# Patient Record
Sex: FEMALE | Race: WHITE | NOT HISPANIC OR LATINO | Employment: FULL TIME | URBAN - METROPOLITAN AREA
[De-identification: names, ages, dates, MRNs, and addresses within clinical notes are randomized per-mention and may not be internally consistent; named-entity substitution may affect disease eponyms.]

---

## 2017-04-03 DIAGNOSIS — E03.4 HYPOTHYROIDISM DUE TO ACQUIRED ATROPHY OF THYROID: ICD-10-CM

## 2017-04-03 NOTE — TELEPHONE ENCOUNTER
Levothyroxine 100mcg     Last Written Prescription Date: 07/22/2016 #90 x 1  Last filled 02/20/2017  Last Office Visit with [facility], P or [facility] prescribing provider: 07/28/2016 CHARLES Kellogg        TSH   Date Value Ref Range Status   07/21/2016 7.64 (H) 0.40 - 4.00 mU/L Final

## 2017-04-03 NOTE — TELEPHONE ENCOUNTER
Routing refill request to provider for review/approval because:  Labs out of range:  See below    Kamilla Carrington RN

## 2017-04-04 RX ORDER — LEVOTHYROXINE SODIUM 100 UG/1
100 TABLET ORAL DAILY
Qty: 90 TABLET | Refills: 0 | Status: SHIPPED | OUTPATIENT
Start: 2017-04-04 | End: 2017-04-23 | Stop reason: DRUGHIGH

## 2017-04-04 NOTE — TELEPHONE ENCOUNTER
Filled once.  Pt is over due for TSH.  Please call and ask her to schedue lab only appointment.  Order in.  Rosario Suazo

## 2017-04-21 ENCOUNTER — OFFICE VISIT (OUTPATIENT)
Dept: FAMILY MEDICINE | Facility: CLINIC | Age: 47
End: 2017-04-21
Payer: COMMERCIAL

## 2017-04-21 VITALS
WEIGHT: 139.6 LBS | BODY MASS INDEX: 24.73 KG/M2 | SYSTOLIC BLOOD PRESSURE: 122 MMHG | DIASTOLIC BLOOD PRESSURE: 80 MMHG | TEMPERATURE: 97.8 F | HEART RATE: 76 BPM | HEIGHT: 63 IN

## 2017-04-21 DIAGNOSIS — Z12.31 ENCOUNTER FOR SCREENING MAMMOGRAM FOR BREAST CANCER: ICD-10-CM

## 2017-04-21 DIAGNOSIS — E03.9 HYPOTHYROIDISM, UNSPECIFIED TYPE: ICD-10-CM

## 2017-04-21 DIAGNOSIS — D50.0 IRON DEFICIENCY ANEMIA DUE TO CHRONIC BLOOD LOSS: ICD-10-CM

## 2017-04-21 DIAGNOSIS — Z00.00 ENCOUNTER FOR ROUTINE ADULT HEALTH EXAMINATION WITHOUT ABNORMAL FINDINGS: Primary | ICD-10-CM

## 2017-04-21 DIAGNOSIS — R53.83 FATIGUE, UNSPECIFIED TYPE: ICD-10-CM

## 2017-04-21 DIAGNOSIS — Z13.6 CARDIOVASCULAR SCREENING; LDL GOAL LESS THAN 160: ICD-10-CM

## 2017-04-21 DIAGNOSIS — E04.1 THYROID NODULE: ICD-10-CM

## 2017-04-21 LAB
ERYTHROCYTE [DISTWIDTH] IN BLOOD BY AUTOMATED COUNT: 13.1 % (ref 10–15)
HCT VFR BLD AUTO: 39.6 % (ref 35–47)
HGB BLD-MCNC: 13 G/DL (ref 11.7–15.7)
MCH RBC QN AUTO: 28.9 PG (ref 26.5–33)
MCHC RBC AUTO-ENTMCNC: 32.8 G/DL (ref 31.5–36.5)
MCV RBC AUTO: 88 FL (ref 78–100)
PLATELET # BLD AUTO: 222 10E9/L (ref 150–450)
RBC # BLD AUTO: 4.5 10E12/L (ref 3.8–5.2)
TSH SERPL DL<=0.05 MIU/L-ACNC: 8.11 MU/L (ref 0.4–4)
WBC # BLD AUTO: 4.1 10E9/L (ref 4–11)

## 2017-04-21 PROCEDURE — 99396 PREV VISIT EST AGE 40-64: CPT | Performed by: FAMILY MEDICINE

## 2017-04-21 PROCEDURE — 82306 VITAMIN D 25 HYDROXY: CPT | Performed by: FAMILY MEDICINE

## 2017-04-21 PROCEDURE — 36415 COLL VENOUS BLD VENIPUNCTURE: CPT | Performed by: FAMILY MEDICINE

## 2017-04-21 PROCEDURE — 85027 COMPLETE CBC AUTOMATED: CPT | Performed by: FAMILY MEDICINE

## 2017-04-21 PROCEDURE — 84443 ASSAY THYROID STIM HORMONE: CPT | Performed by: FAMILY MEDICINE

## 2017-04-21 NOTE — MR AVS SNAPSHOT
After Visit Summary   4/21/2017    Kendra Montenegro    MRN: [id number]           Patient Information     Date Of Birth          1970        Visit Information        Provider Department      4/21/2017 2:00 PM Rosario Suazo DO [facility]        Today's Diagnoses     Encounter for routine adult health examination without abnormal findings    -  1    Hypothyroidism, unspecified type        Iron deficiency anemia due to chronic blood loss        Fatigue, unspecified type        Thyroid nodule        Encounter for screening mammogram for breast cancer        CARDIOVASCULAR SCREENING; LDL GOAL LESS THAN 160          Care Instructions    I'll let you know your lab results when they are available.    Please schedule the thyroid ultrasound when you are able to follow up the nodule    You can call [phone] to schedule your mammogram at the same time    Please call to schedule a lab appointment for the fasting cholesterol when you are able.  You will need to be fasting for 12 hours (nothing but water) prior to your blood work.    Preventive Health Recommendations  Female Ages 40 to 49    Yearly exam:     See your health care provider every year in order to  1. Review health changes.   2. Discuss preventive care.    3. Review your medicines if your doctor prescribed any.      Get a Pap test every three years (unless you have an abnormal result and your provider advises testing more often).      If you get Pap tests with HPV test, you only need to test every 5 years, unless you have an abnormal result. You do not need a Pap test if your uterus was removed (hysterectomy) and you have not had cancer.      You should be tested each year for STDs (sexually transmitted diseases), if you're at risk.       Ask your doctor if you should have a mammogram.      Have a colonoscopy (test for colon cancer) if someone in your family has had colon cancer or polyps before age 50.       Have a  cholesterol test every 5 years.       Have a diabetes test (fasting glucose) after age 45. If you are at risk for diabetes, you should have this test every 3 years.    Shots: Get a flu shot each year. Get a tetanus shot every 10 years.     Nutrition:     Eat at least 5 servings of fruits and vegetables each day.    Eat whole-grain bread, whole-wheat pasta and brown rice instead of white grains and rice.    Talk to your provider about Calcium and Vitamin D.     Lifestyle    Exercise at least 150 minutes a week (an average of 30 minutes a day, 5 days a week). This will help you control your weight and prevent disease.    Limit alcohol to one drink per day.    No smoking.     Wear sunscreen to prevent skin cancer.    See your dentist every six months for an exam and cleaning.        Follow-ups after your visit        Future tests that were ordered for you today     Open Future Orders        Priority Expected Expires Ordered    Lipid Profile with reflex to direct LDL Routine 4/24/2017 4/21/2018 4/21/2017    MA Screening Digital Bilateral Routine  4/21/2018 4/21/2017    US Thyroid Routine  4/21/2018 4/21/2017            Who to contact     Normal or non-critical lab and imaging results will be communicated to you by [employer], letter or phone within 4 business days after the clinic has received the results. If you do not hear from us within 7 days, please contact the clinic through [employer] or phone. If you have a critical or abnormal lab result, we will notify you by phone as soon as possible.  Submit refill requests through [email or web address] or call your pharmacy and they will forward the refill request to us. Please allow 3 business days for your refill to be completed.          If you need to speak with a  for additional information , please call: [phone]           Additional Information About Your Visit        [email or web address] Information     [email or web address] lets you send messages to your doctor, view your test results,  "renew your prescriptions, schedule appointments and more. To sign up, go to [email or web address] . Click on \"Log in\" on the left side of the screen, which will take you to the Welcome page. Then click on \"Sign up Now\" on the right side of the page.     You will be asked to enter the access code listed below, as well as some personal information. Please follow the directions to create your username and password.     Your access code is: [email or web address]  Expires: 2017  2:29 PM     Your access code will  in 90 days. If you need help or a new code, please call your [facility] clinic or [phone].        Care EveryWhere ID     This is your Care EveryWhere ID. This could be used by other organizations to access your [facility] medical records  [id number]        Your Vitals Were     Pulse Temperature Height Last Period Breastfeeding? BMI (Body Mass Index)    76 97.8  F (36.6  C) (Tympanic) 5' 2.75\" (1.594 m) 2015 (LMP Unknown) No 24.93 kg/m2       Blood Pressure from Last 3 Encounters:   17 122/80   16 124/70   16 114/73    Weight from Last 3 Encounters:   17 139 lb 9.6 oz (63.3 kg)   16 137 lb (62.1 kg)   16 134 lb 9.6 oz (61.1 kg)              We Performed the Following     CBC with platelets     TSH     Vitamin D Deficiency        Primary Care Provider Office Phone # Fax #    Rosario Suazo [phone]       [facility] [address] [place] [address]        Thank you!     Thank you for choosing [facility]  for your care. Our goal is always to provide you with excellent care. Hearing back from our patients is one way we can continue to improve our services. Please take a few minutes to complete the written survey that you may receive in the mail after your visit with us. Thank you!             Your Updated Medication List - Protect others around you: Learn how to safely use, store and throw away your medicines at " www.disposemymeds.org.          This list is accurate as of: 4/21/17  2:40 PM.  Always use your most recent med list.                   Brand Name Dispense Instructions for use    levothyroxine 100 MCG tablet    SYNTHROID/LEVOTHROID    90 tablet    Take 1 tablet (100 mcg) by mouth daily       Multiple vitamin Tabs      Take 1 tablet by mouth daily

## 2017-04-21 NOTE — PATIENT INSTRUCTIONS
I'll let you know your lab results when they are available.    Please schedule the thyroid ultrasound when you are able to follow up the nodule    You can call [phone] to schedule your mammogram at the same time    Please call to schedule a lab appointment for the fasting cholesterol when you are able.  You will need to be fasting for 12 hours (nothing but water) prior to your blood work.    Preventive Health Recommendations  Female Ages 40 to 49    Yearly exam:     See your health care provider every year in order to  1. Review health changes.   2. Discuss preventive care.    3. Review your medicines if your doctor prescribed any.      Get a Pap test every three years (unless you have an abnormal result and your provider advises testing more often).      If you get Pap tests with HPV test, you only need to test every 5 years, unless you have an abnormal result. You do not need a Pap test if your uterus was removed (hysterectomy) and you have not had cancer.      You should be tested each year for STDs (sexually transmitted diseases), if you're at risk.       Ask your doctor if you should have a mammogram.      Have a colonoscopy (test for colon cancer) if someone in your family has had colon cancer or polyps before age 50.       Have a cholesterol test every 5 years.       Have a diabetes test (fasting glucose) after age 45. If you are at risk for diabetes, you should have this test every 3 years.    Shots: Get a flu shot each year. Get a tetanus shot every 10 years.     Nutrition:     Eat at least 5 servings of fruits and vegetables each day.    Eat whole-grain bread, whole-wheat pasta and brown rice instead of white grains and rice.    Talk to your provider about Calcium and Vitamin D.     Lifestyle    Exercise at least 150 minutes a week (an average of 30 minutes a day, 5 days a week). This will help you control your weight and prevent disease.    Limit alcohol to one drink per day.    No smoking.     Wear  sunscreen to prevent skin cancer.    See your dentist every six months for an exam and cleaning.

## 2017-04-21 NOTE — PROGRESS NOTES
"   SUBJECTIVE:     CC: Kendra Montenegro is an 46 year old woman who presents for preventive health visit.     Healthy Habits:    Do you get at least three servings of calcium containing foods daily (dairy, green leafy vegetables, etc.)? yes    Amount of exercise or daily activities, outside of work: 2 day(s) per week     Problems taking medications regularly No    Medication side effects: No    Have you had an eye exam in the past two years? no    Do you see a dentist twice per year? yes    Do you have sleep apnea, excessive snoring or daytime drowsiness?no      * requesting to have hemoglobin checked - she \"feels off\" sometimes.  Feels like a \"brain fog\".  Concerned that she might be anemic again.    Had hyst last year due to menorrhagia and severe anemia.  No bleeding since that time.    Had previously followed with endocrinology for her hypothyroidism but has not seen them in some time.  Requesting to transfer her thyroid management here.  Had a >1cm nodule noted on ultrasound.  Pt states she did have biopsy with her endocrinologist which was normal.  Needs f/u imaging.        Today's PHQ-2 Score:   PHQ-2 ( 1999 Pf[employer]) 4/21/2017 5/31/2016   Q1: Little interest or pleasure in doing things 0 0   Q2: Feeling down, depressed or hopeless 0 0   PHQ-2 Score 0 0       Abuse: Current or Past(Physical, Sexual or Emotional)- No  Do you feel safe in your environment - Yes    Social History   Substance Use Topics     Smoking status: Never Smoker     Smokeless tobacco: Never Used     Alcohol use No     The patient does not drink >3 drinks per day nor >7 drinks per week.    No results for input(s): CHOL, HDL, LDL, TRIG, CHOLHDLRATIO, NHDL in the last 20189 hours.    Reviewed orders with patient.  Reviewed health maintenance and updated orders accordingly - Yes    Mammo Decision Support:  Patient under age 50, mutual decision reflected in health maintenance.      Pertinent mammograms are reviewed under the imaging tab.  History " of abnormal Pap smear: Status post benign hysterectomy. Health Maintenance and Surgical History updated.    Reviewed and updated as needed this visit by clinical staff  Tobacco  Allergies  Meds  Med Hx  Surg Hx  Fam Hx  Soc Hx        Reviewed and updated as needed this visit by Provider  Tobacco  Allergies  Med Hx  Surg Hx  Fam Hx  Soc Hx       Past Medical History:   Diagnosis Date     Hx of abnormal Pap smear ??    possible CRYO in the past?      Past Surgical History:   Procedure Laterality Date     CYSTOSCOPY N/A 1/11/2016    Procedure: CYSTOSCOPY;  Surgeon: Juan Jose Burgess MD;  Location: [facility] OR     DILATION AND CURETTAGE, HYSTEROSCOPY DIAGNOSTIC, COMBINED N/A 10/2/2015    Procedure: COMBINED DILATION AND CURETTAGE, HYSTEROSCOPY DIAGNOSTIC;  Surgeon: Juan Jose Burgess MD;  Location: [facility] OR     HYSTERECTOMY, PAP NO LONGER INDICATED  1/11/2016     LAPAROSCOPIC HYSTERECTOMY TOTAL, BILATERAL SALPINGO-OOPHORECTOMY, COMBINED N/A 1/11/2016    Procedure: COMBINED LAPAROSCOPIC HYSTERECTOMY TOTAL, SALPINGO-OOPHORECTOMY;  Surgeon: Juan Jose Burgess MD;  Location: [facility] OR     SURGICAL HISTORY OF -   1999    removal of endometriosis,L ovary and a fallopian tube     TUBAL LIGATION         ROS:  C: NEGATIVE for fever, chills, change in weight  I: NEGATIVE for worrisome rashes, moles or lesions  E: NEGATIVE for vision changes or irritation  ENT: NEGATIVE for ear, mouth and throat problems  R: NEGATIVE for significant cough or SOB  B: NEGATIVE for masses, tenderness or discharge  CV: NEGATIVE for chest pain, palpitations or peripheral edema  GI: NEGATIVE for nausea, abdominal pain, heartburn, or change in bowel habits  : NEGATIVE for unusual urinary or vaginal symptoms. No vaginal bleeding.  M: NEGATIVE for significant arthralgias or myalgia  N: NEGATIVE for weakness, dizziness or paresthesias  P: NEGATIVE for changes in mood or affect     Problem list, Medication list, Allergies, and  "Medical/Social/Surgical histories reviewed in [facility] and updated as appropriate.  OBJECTIVE:     /80 (BP Location: Left arm, Cuff Size: Adult Regular)  Pulse 76  Temp 97.8  F (36.6  C) (Tympanic)  Ht 5' 2.75\" (1.594 m)  Wt 139 lb 9.6 oz (63.3 kg)  LMP 11/29/2015 (LMP Unknown)  Breastfeeding? No  BMI 24.93 kg/m2  EXAM:  GENERAL: healthy, alert and no distress  EYES: Eyes grossly normal to inspection, PERRL and conjunctivae and sclerae normal  HENT: ear canals and TM's normal, nose and mouth without ulcers or lesions  NECK: no adenopathy, no asymmetry, masses, or scars and thyroid normal to palpation  RESP: lungs clear to auscultation - no rales, rhonchi or wheezes  BREAST: normal without masses, tenderness or nipple discharge and no palpable axillary masses or adenopathy  CV: regular rate and rhythm, normal S1 S2, no S3 or S4, no murmur, click or rub, no peripheral edema and peripheral pulses strong  ABDOMEN: soft, nontender, no hepatosplenomegaly, no masses and bowel sounds normal  MS: no gross musculoskeletal defects noted, no edema  NEURO: Normal strength and tone, mentation intact and speech normal  PSYCH: mentation appears normal, affect normal/bright    ASSESSMENT/PLAN:         ICD-10-CM    1. Encounter for routine adult health examination without abnormal findings Z00.00    2. Hypothyroidism, unspecified type E03.9 TSH   3. Iron deficiency anemia due to chronic blood loss D50.0 CBC with platelets   4. Fatigue, unspecified type R53.83 Vitamin D Deficiency   5. Thyroid nodule E04.1 US Thyroid   6. Encounter for screening mammogram for breast cancer Z12.31 MA Screening Digital Bilateral   7. CARDIOVASCULAR SCREENING; LDL GOAL LESS THAN 160 Z13.6 Lipid Profile with reflex to direct LDL       COUNSELING:   Reviewed preventive health counseling, as reflected in patient instructions  Special attention given to:        Regular exercise       Healthy diet/nutrition       Osteoporosis Prevention/Bone Health   " "    Colon cancer screening       (Celia)menopause management    BP Screening:   Last 3 BP Readings:    BP Readings from Last 3 Encounters:   04/21/17 122/80   07/28/16 124/70   05/31/16 114/73       The following was recommended to the patient:  Re-screen BP within a year and recommended lifestyle modifications     reports that she has never smoked. She has never used smokeless tobacco.    Estimated body mass index is 24.93 kg/(m^2) as calculated from the following:    Height as of this encounter: 5' 2.75\" (1.594 m).    Weight as of this encounter: 139 lb 9.6 oz (63.3 kg).       Counseling Resources:  ATP IV Guidelines  Pooled Cohorts Equation Calculator  Breast Cancer Risk Calculator  FRAX Risk Assessment  ICSI Preventive Guidelines  Dietary Guidelines for Americans, 2010  [employer]'s MyPlate  ASA Prophylaxis  Lung CA Screening    Rosario Suazo, DO  [facility]    Patient Instructions   I'll let you know your lab results when they are available.    Please schedule the thyroid ultrasound when you are able to follow up the nodule    You can call [phone] to schedule your mammogram at the same time    Please call to schedule a lab appointment for the fasting cholesterol when you are able.  You will need to be fasting for 12 hours (nothing but water) prior to your blood work.    Preventive Health Recommendations  Female Ages 40 to 49    Yearly exam:     See your health care provider every year in order to  1. Review health changes.   2. Discuss preventive care.    3. Review your medicines if your doctor prescribed any.      Get a Pap test every three years (unless you have an abnormal result and your provider advises testing more often).      If you get Pap tests with HPV test, you only need to test every 5 years, unless you have an abnormal result. You do not need a Pap test if your uterus was removed (hysterectomy) and you have not had cancer.      You should be tested each year for STDs (sexually " transmitted diseases), if you're at risk.       Ask your doctor if you should have a mammogram.      Have a colonoscopy (test for colon cancer) if someone in your family has had colon cancer or polyps before age 50.       Have a cholesterol test every 5 years.       Have a diabetes test (fasting glucose) after age 45. If you are at risk for diabetes, you should have this test every 3 years.    Shots: Get a flu shot each year. Get a tetanus shot every 10 years.     Nutrition:     Eat at least 5 servings of fruits and vegetables each day.    Eat whole-grain bread, whole-wheat pasta and brown rice instead of white grains and rice.    Talk to your provider about Calcium and Vitamin D.     Lifestyle    Exercise at least 150 minutes a week (an average of 30 minutes a day, 5 days a week). This will help you control your weight and prevent disease.    Limit alcohol to one drink per day.    No smoking.     Wear sunscreen to prevent skin cancer.    See your dentist every six months for an exam and cleaning.

## 2017-04-21 NOTE — LETTER
[facility]  [address] [address]  [phone]        April 27, 2018    Kendra Montenegro  [address] [address]              Dear Kendra Montenegro    This is to remind you that your fasting lab is due.    You may call our office at [phone] to schedule an appointment.    Please disregard this notice if you have already had your labs drawn or made an appointment.        Sincerely,        Rosario Suazo DO

## 2017-04-21 NOTE — NURSING NOTE
"Initial /80 (BP Location: Left arm, Cuff Size: Adult Regular)  Pulse 76  Temp 97.8  F (36.6  C) (Tympanic)  Ht 5' 2.75\" (1.594 m)  Wt 139 lb 9.6 oz (63.3 kg)  LMP 11/29/2015 (LMP Unknown)  Breastfeeding? No  BMI 24.93 kg/m2 Estimated body mass index is 24.93 kg/(m^2) as calculated from the following:    Height as of this encounter: 5' 2.75\" (1.594 m).    Weight as of this encounter: 139 lb 9.6 oz (63.3 kg). .      "

## 2017-04-22 LAB — DEPRECATED CALCIDIOL+CALCIFEROL SERPL-MC: 16 UG/L (ref 20–75)

## 2017-04-23 DIAGNOSIS — E06.3 HYPOTHYROIDISM DUE TO HASHIMOTO'S THYROIDITIS: Primary | ICD-10-CM

## 2017-04-23 RX ORDER — LEVOTHYROXINE SODIUM 112 UG/1
112 TABLET ORAL DAILY
Qty: 90 TABLET | Refills: 0 | Status: SHIPPED | OUTPATIENT
Start: 2017-04-23 | End: 2017-05-26

## 2017-05-25 DIAGNOSIS — E03.4 HYPOTHYROIDISM DUE TO ACQUIRED ATROPHY OF THYROID: ICD-10-CM

## 2017-05-25 LAB — TSH SERPL DL<=0.05 MIU/L-ACNC: 1.07 MU/L (ref 0.4–4)

## 2017-05-25 PROCEDURE — 36415 COLL VENOUS BLD VENIPUNCTURE: CPT | Performed by: FAMILY MEDICINE

## 2017-05-25 PROCEDURE — 84443 ASSAY THYROID STIM HORMONE: CPT | Performed by: FAMILY MEDICINE

## 2017-05-26 ENCOUNTER — TELEPHONE (OUTPATIENT)
Dept: FAMILY MEDICINE | Facility: CLINIC | Age: 47
End: 2017-05-26

## 2017-05-26 DIAGNOSIS — E06.3 HYPOTHYROIDISM DUE TO HASHIMOTO'S THYROIDITIS: ICD-10-CM

## 2017-05-26 RX ORDER — LEVOTHYROXINE SODIUM 112 UG/1
112 TABLET ORAL DAILY
Qty: 90 TABLET | Refills: 1 | Status: SHIPPED | OUTPATIENT
Start: 2017-05-26 | End: 2018-01-11

## 2017-05-26 NOTE — TELEPHONE ENCOUNTER
Patient called and came in yesterday for lab work to get a refill on her levothyroxine.  Patient was wondering if her blood work can be looked at so she can get her medication refilled today and sent to [facility] [address].    Thank you    Yelena Anne, [facility]

## 2017-05-30 ENCOUNTER — HOSPITAL ENCOUNTER (OUTPATIENT)
Dept: MAMMOGRAPHY | Facility: CLINIC | Age: 47
End: 2017-05-30
Attending: FAMILY MEDICINE
Payer: COMMERCIAL

## 2017-05-30 ENCOUNTER — HOSPITAL ENCOUNTER (OUTPATIENT)
Dept: ULTRASOUND IMAGING | Facility: CLINIC | Age: 47
Discharge: HOME | End: 2017-05-30
Attending: FAMILY MEDICINE | Admitting: FAMILY MEDICINE
Payer: COMMERCIAL

## 2017-05-30 DIAGNOSIS — Z12.31 ENCOUNTER FOR SCREENING MAMMOGRAM FOR BREAST CANCER: ICD-10-CM

## 2017-05-30 DIAGNOSIS — E04.1 THYROID NODULE: ICD-10-CM

## 2017-05-30 PROCEDURE — 76536 US EXAM OF HEAD AND NECK: CPT

## 2017-05-30 PROCEDURE — G0202 SCR MAMMO BI INCL CAD: HCPCS

## 2017-06-11 ENCOUNTER — NURSE TRIAGE (OUTPATIENT)
Dept: NURSING | Facility: CLINIC | Age: 47
End: 2017-06-11

## 2017-06-11 NOTE — TELEPHONE ENCOUNTER
"Caller states that for past  24 hrs, exclusive of sleep has been experiencing  Sensation that starts at frontal hairline and goes down across forehead and feels eyelid \"nusrat close\" ; sensaion  Lasts brief second or  2 and is of varying intensity and this afternoon more frequent,  happening several times in an hour.    Reason for Disposition    [1] Eye pain/discomfort AND [2] more than mild    Additional Information    Negative: Followed an eye injury    Negative: Eye pain from chemical in the eye    Negative: Eye pain from foreign body in eye    Negative: [1] Tender, red lump or pimple AND [2] located along the eyelid margin    Negative: Has sinus pain or pressure    Negative: Severe eye pain    Negative: Complete loss of vision in one or both eyes    Negative: [1] Eyelids are very swollen (shut or almost) AND [2] fever    Negative: [1] Eyelid (outer) is very red AND [2] fever    Negative: [1] Foreign body sensation (\"feels like something is in there\") AND [2] irrigation didn't help    Negative: Vomiting    Negative: Ulcer or sore seen on the cornea (clear center part of the eye)    Negative: [1] Recent eye surgery AND [2] increasing eye pain    Negative: [1] Blurred vision AND [2] new or worsening    Negative: Patient sounds very sick or weak to the triager    Protocols used: EYE PAIN-ADULT-  Shanda Beltran RN  [facility]    "

## 2018-01-11 DIAGNOSIS — E06.3 HYPOTHYROIDISM DUE TO HASHIMOTO'S THYROIDITIS: ICD-10-CM

## 2018-01-11 NOTE — TELEPHONE ENCOUNTER
"Requested Prescriptions   Pending Prescriptions Disp Refills     levothyroxine (SYNTHROID/LEVOTHROID) 112 MCG tablet [Pharmacy Med Name: LEVOTHYROXINE 0.112MG (112MCG) TABS] 90 tablet 0    Last Written Prescription Date:  05/26/2017 #90 x 1  Last filled 12/08/2017  Last Office Visit with [facility], P or [facility] prescribing provider:  04/21/2017 CHARLES Suazo   Future Office Visit:  none   Sig: TAKE 1 TABLET(112 MCG) BY MOUTH DAILY    Thyroid Protocol Passed    1/11/2018  7:38 AM       Passed - Patient is 12 years or older       Passed - Recent or future visit with authorizing provider's specialty    Patient had office visit in the last year or has a visit in the next 30 days with authorizing provider.  See \"Patient Info\" tab in inbasket, or \"Choose Columns\" in Meds & Orders section of the refill encounter.              Passed - Normal TSH on file in past 12 months    Recent Labs   Lab Test  05/25/17   0954   TSH  1.07             Passed - No active pregnancy on record    If patient is pregnant or has had a positive pregnancy test, please check TSH.         Passed - No positive pregnancy test in past 12 months    If patient is pregnant or has had a positive pregnancy test, please check TSH.            "

## 2018-01-12 RX ORDER — LEVOTHYROXINE SODIUM 112 UG/1
TABLET ORAL
Qty: 30 TABLET | Refills: 0 | Status: SHIPPED | OUTPATIENT
Start: 2018-01-12 | End: 2018-01-19 | Stop reason: DRUGHIGH

## 2018-01-17 DIAGNOSIS — E06.3 HYPOTHYROIDISM DUE TO HASHIMOTO'S THYROIDITIS: ICD-10-CM

## 2018-01-17 LAB — TSH SERPL DL<=0.005 MIU/L-ACNC: 5.69 MU/L (ref 0.4–4)

## 2018-01-17 PROCEDURE — 84443 ASSAY THYROID STIM HORMONE: CPT | Performed by: FAMILY MEDICINE

## 2018-01-17 PROCEDURE — 36415 COLL VENOUS BLD VENIPUNCTURE: CPT | Performed by: FAMILY MEDICINE

## 2018-01-19 ENCOUNTER — TELEPHONE (OUTPATIENT)
Dept: FAMILY MEDICINE | Facility: CLINIC | Age: 48
End: 2018-01-19

## 2018-01-19 DIAGNOSIS — E06.3 HYPOTHYROIDISM DUE TO HASHIMOTO'S THYROIDITIS: Primary | ICD-10-CM

## 2018-01-19 RX ORDER — LEVOTHYROXINE SODIUM 125 UG/1
125 TABLET ORAL DAILY
Qty: 90 TABLET | Refills: 0 | Status: SHIPPED | OUTPATIENT
Start: 2018-01-19 | End: 2018-06-11

## 2018-01-20 NOTE — TELEPHONE ENCOUNTER
Please call pt.  TSH was a bit elevated again.  I sent a new prescription to her pharmacy for a higher dose.  We should recheck TSH again in 3 months.    Rosario Suazo

## 2018-02-10 ENCOUNTER — HEALTH MAINTENANCE LETTER (OUTPATIENT)
Age: 48
End: 2018-02-10

## 2018-02-17 ENCOUNTER — NURSE TRIAGE (OUTPATIENT)
Dept: NURSING | Facility: CLINIC | Age: 48
End: 2018-02-17

## 2018-02-17 NOTE — TELEPHONE ENCOUNTER
Kendra was in on January and had blood drawn and missed some calls regarding test results.  Kendra is calling today for lab results.  [facility] relayed results and advised medication prescription was sent to pharmacy.

## 2018-03-26 ENCOUNTER — OFFICE VISIT (OUTPATIENT)
Dept: FAMILY MEDICINE | Facility: CLINIC | Age: 48
End: 2018-03-26
Payer: COMMERCIAL

## 2018-03-26 VITALS
DIASTOLIC BLOOD PRESSURE: 72 MMHG | SYSTOLIC BLOOD PRESSURE: 114 MMHG | BODY MASS INDEX: 26.14 KG/M2 | TEMPERATURE: 97.3 F | HEART RATE: 72 BPM | WEIGHT: 146.4 LBS

## 2018-03-26 DIAGNOSIS — L90.0 LICHEN SCLEROSUS: Primary | ICD-10-CM

## 2018-03-26 DIAGNOSIS — E06.3 HYPOTHYROIDISM DUE TO HASHIMOTO'S THYROIDITIS: ICD-10-CM

## 2018-03-26 DIAGNOSIS — N95.2 VAGINAL ATROPHY: ICD-10-CM

## 2018-03-26 DIAGNOSIS — K59.00 CONSTIPATION, UNSPECIFIED CONSTIPATION TYPE: ICD-10-CM

## 2018-03-26 LAB — TSH SERPL DL<=0.005 MIU/L-ACNC: 2.26 MU/L (ref 0.4–4)

## 2018-03-26 PROCEDURE — 36415 COLL VENOUS BLD VENIPUNCTURE: CPT | Performed by: FAMILY MEDICINE

## 2018-03-26 PROCEDURE — 84443 ASSAY THYROID STIM HORMONE: CPT | Performed by: FAMILY MEDICINE

## 2018-03-26 PROCEDURE — 99214 OFFICE O/P EST MOD 30 MIN: CPT | Performed by: FAMILY MEDICINE

## 2018-03-26 RX ORDER — CLOBETASOL PROPIONATE 0.5 MG/G
CREAM TOPICAL
Qty: 15 G | Refills: 1 | Status: SHIPPED | OUTPATIENT
Start: 2018-03-26 | End: 2018-05-31

## 2018-03-26 NOTE — PATIENT INSTRUCTIONS
For the constipation:  I would recommend adding in stool softener as needed such as colace.  I will let you know the thyroid results when available.    For the vaginal dryness:  You can try and over the counter moisturizer like replens.  If not effective the next step would be a vaginal estrogen as we discussed.    For the lichen sclerosus:  Apply the steroid cream as we discussed following the regimen listed on the prescription.  Please plan on following up with myself or Dr Burgess in 4 weeks.

## 2018-03-26 NOTE — LETTER
March 27, 2018      Kendra SALONI Lan  [address] [address]        Dear MsIngaMontenegro,    We are writing to inform you of your test results.    Your thyroid is now in good control.  Please continue your current dose and plan a thyroid recheck in 6 months     Resulted Orders   TSH   Result Value Ref Range    TSH 2.26 0.40 - 4.00 mU/L       If you have any questions or concerns, please call the clinic at the number listed above.       Sincerely,        Rosario Suazo, DO/am

## 2018-03-26 NOTE — MR AVS SNAPSHOT
"              After Visit Summary   3/26/2018    Kendra Montenegro    MRN: [id number]           Patient Information     Date Of Birth          1970        Visit Information        Provider Department      3/26/2018 8:20 AM Rosario Suazo DO [facility]        Today's Diagnoses     Lichen sclerosus    -  1    Vaginal atrophy        Hypothyroidism due to Hashimoto's thyroiditis          Care Instructions    For the constipation:  I would recommend adding in stool softener as needed such as colace.  I will let you know the thyroid results when available.    For the vaginal dryness:  You can try and over the counter moisturizer like replens.  If not effective the next step would be a vaginal estrogen as we discussed.    For the lichen sclerosus:  Apply the steroid cream as we discussed following the regimen listed on the prescription.  Please plan on following up with myself or Dr Burgess in 4 weeks.              Follow-ups after your visit        Who to contact     Normal or non-critical lab and imaging results will be communicated to you by InstantLuxehart, letter or phone within 4 business days after the clinic has received the results. If you do not hear from us within 7 days, please contact the clinic through InstantLuxehart or phone. If you have a critical or abnormal lab result, we will notify you by phone as soon as possible.  Submit refill requests through barcoo or call your pharmacy and they will forward the refill request to us. Please allow 3 business days for your refill to be completed.          If you need to speak with a  for additional information , please call: [phone]           Additional Information About Your Visit        barcoo Information     barcoo lets you send messages to your doctor, view your test results, renew your prescriptions, schedule appointments and more. To sign up, go to [email or web address] . Click on \"Log in\" on the left side of the screen, " "which will take you to the Welcome page. Then click on \"Sign up Now\" on the right side of the page.     You will be asked to enter the access code listed below, as well as some personal information. Please follow the directions to create your username and password.     Your access code is: [email or web address]  Expires: 2018  9:11 AM     Your access code will  in 90 days. If you need help or a new code, please call your [facility] or [phone].        Care EveryWhere ID     This is your Care EveryWhere ID. This could be used by other organizations to access your [place] medical records  [id number]        Your Vitals Were     Pulse Temperature Last Period BMI (Body Mass Index)          72 97.3  F (36.3  C) (Tympanic) 2015 (LMP Unknown) 26.14 kg/m2         Blood Pressure from Last 3 Encounters:   18 114/72   17 122/80   16 124/70    Weight from Last 3 Encounters:   18 146 lb 6.4 oz (66.4 kg)   17 139 lb 9.6 oz (63.3 kg)   16 137 lb (62.1 kg)              We Performed the Following     TSH          Today's Medication Changes          These changes are accurate as of 3/26/18  9:11 AM.  If you have any questions, ask your nurse or doctor.               Start taking these medicines.        Dose/Directions    clobetasol 0.05 % cream   Commonly known as:  TEMOVATE   Used for:  Lichen sclerosus   Started by:  Rosario Suazo, DO        Apply sparingly to affected area daily for 4 weeks followed by every other night for 4 weeks then twice weekly.    Do not apply to face.   Quantity:  15 g   Refills:  1            Where to get your medicines      These medications [id number] [place] - [address] AT [facility]  [address] [place] [address]     Phone:  [phone]     clobetasol 0.05 % cream                Primary Care Provider Office Phone # Fax #    Rosario Suazo [phone]    "    [address] [place] [address]        Equal Access to Services     KEATON PENG : Hadii yfn Brothers, thierry polanco, qajose angel tomlinsonmajered hou, shauna gorman. So [facility] [phone].    ATENCIÓN: Si habla español, tiene a moncada disposición servicios gratuitos de asistencia lingüística. Llame al [phone].    We comply with applicable federal civil rights laws and [place] laws. We do not discriminate on the basis of race, color, national origin, age, disability, sex, sexual orientation, or gender identity.            Thank you!     [facility] [place]  [facility]. Our goal is always to provide you with excellent care. Hearing back from our patients is one way we can continue to improve our services. Please take a few minutes to complete the written survey that you may receive in the mail after your visit with us. Thank you!             Your Updated Medication List - Protect others around you: Learn how to safely use, store and throw away your medicines at www.disposemymeds.org.          This list is accurate as of 3/26/18  9:11 AM.  Always use your most recent med list.                   Brand Name Dispense Instructions for use Diagnosis    clobetasol 0.05 % cream    TEMOVATE    15 g    Apply sparingly to affected area daily for 4 weeks followed by every other night for 4 weeks then twice weekly.    Do not apply to face.    Lichen sclerosus       levothyroxine 125 MCG tablet    SYNTHROID/LEVOTHROID    90 tablet    Take 1 tablet (125 mcg) by mouth daily    Hypothyroidism due to Hashimoto's thyroiditis       Multiple vitamin Tabs      Take 1 tablet by mouth daily

## 2018-03-26 NOTE — PROGRESS NOTES
SUBJECTIVE:   Kendra Montenegro is a 47 year old female who presents to clinic today for the following health issues:      Hypothyroidism Follow-up      Since last visit, patient describes the following symptoms: weight gain of 6 lbs, constipation, dry skin and fatigue      Amount of exercise or physical activity: 2-3 days/week for an average of 15-30 minutes    Problems taking medications regularly: No    Medication side effects: none    Diet: regular (no restrictions)    Has been taking her med daily, no missed doses but did not take today.  Has had more of an issues with constipation and weight gain.  Has tried increasing fluid and fiber.  Currently stooling every other day.  Had a dose change with her last check. Almost 12 weeks ago.      *Patient states that since her hysterectomy she is very dry and intercourse is not enjoyable. She would like to know what she can do to help this.  Feels this is getting progressively worse.  Tried KY jelly once but felt she might have gotten a yeast infection from it so did not try any other lubricants.  Has pain and dryness with penetration, feels like complete penetration is not possible due to pain and dryness. Did have a small amount of bleeding once, felt like there was a tear.  Has now just been avoiding intercourse    No itching or discomfort.  No further bleeding.      Problem list and histories reviewed & adjusted, as indicated.  Additional history: as documented      Reviewed and updated as needed this visit by clinical staff  Tobacco  Allergies  Meds  Problems  Med Hx  Surg Hx  Fam Hx  Soc Hx        Reviewed and updated as needed this visit by Provider  Tobacco  Allergies  Meds  Problems  Med Hx  Surg Hx  Fam Hx  Soc Hx          ROS: Remainder of Constitutional, CV, Respiratory, GI,  negative with exception of that mentioned above    PE:  VS as above   Gen:  WN/WD/WH female in NAD   Neck:  supple, no LAD appreciated   Heart:  RRR without murmur, nl  S1, S2, no rubs or gallops   Lungs CTA alexis without rales/ronchi/wheezes   :  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  No fissures, ulcers or areas of thickening noted.  Vaginal mucosa mildly atrophic with absent cervix.  No lesions of lacerations apparent    A/P:      ICD-10-CM    1. Lichen sclerosus L90.0 clobetasol (TEMOVATE) 0.05 % cream   2. Vaginal atrophy N95.2    3. Hypothyroidism due to Hashimoto's thyroiditis E03.8 TSH    E06.3    4. Constipation, unspecified constipation type K59.00      Reviewed with pt proper dosing of clobetasol using mirror.  She verbalized understanding.  Reviewed increased risk of vulvar cancers with lichen sclerosis and importance of treatment and follow up along with at least year exams.  She verbalized understanding    Discussed vaginal atrophy and treatment options including lubricants for intercourse, vaginal moisturizers and vaginal estrogens.  She has opts for OCT vaginal moisturizer first.  Will consider vaginal estrogen if not effective    TSH as ordered.    Patient Instructions   For the constipation:  I would recommend adding in stool softener as needed such as colace.  I will let you know the thyroid results when available.    For the vaginal dryness:  You can try and over the counter moisturizer like replens.  If not effective the next step would be a vaginal estrogen as we discussed.    For the lichen sclerosus:  Apply the steroid cream as we discussed following the regimen listed on the prescription.  Please plan on following up with myself or Dr Burgess in 4 weeks.

## 2018-03-26 NOTE — NURSING NOTE
"Chief Complaint   Patient presents with     Thyroid Problem     Vaginal Problem       Initial /72  Pulse 72  Temp 97.3  F (36.3  C) (Tympanic)  Wt 146 lb 6.4 oz (66.4 kg)  LMP 11/29/2015 (LMP Unknown)  BMI 26.14 kg/m2 Estimated body mass index is 26.14 kg/(m^2) as calculated from the following:    Height as of 4/21/17: 5' 2.75\" (1.594 m).    Weight as of this encounter: 146 lb 6.4 oz (66.4 kg).  Medication Reconciliation: complete     Dinorah Baker CMA(AMAA)      "

## 2018-05-29 ENCOUNTER — TELEPHONE (OUTPATIENT)
Dept: FAMILY MEDICINE | Facility: CLINIC | Age: 48
End: 2018-05-29

## 2018-05-30 DIAGNOSIS — Z13.6 CARDIOVASCULAR SCREENING; LDL GOAL LESS THAN 160: ICD-10-CM

## 2018-05-30 LAB
CHOLEST SERPL-MCNC: 167 MG/DL
HDLC SERPL-MCNC: 45 MG/DL
LDLC SERPL CALC-MCNC: 107 MG/DL
NONHDLC SERPL-MCNC: 122 MG/DL
TRIGL SERPL-MCNC: 77 MG/DL

## 2018-05-30 PROCEDURE — 80061 LIPID PANEL: CPT | Performed by: FAMILY MEDICINE

## 2018-05-30 PROCEDURE — 84443 ASSAY THYROID STIM HORMONE: CPT | Performed by: FAMILY MEDICINE

## 2018-05-30 PROCEDURE — 36415 COLL VENOUS BLD VENIPUNCTURE: CPT | Performed by: FAMILY MEDICINE

## 2018-05-31 ENCOUNTER — OFFICE VISIT (OUTPATIENT)
Dept: FAMILY MEDICINE | Facility: CLINIC | Age: 48
End: 2018-05-31
Payer: COMMERCIAL

## 2018-05-31 VITALS
DIASTOLIC BLOOD PRESSURE: 78 MMHG | BODY MASS INDEX: 25.76 KG/M2 | SYSTOLIC BLOOD PRESSURE: 114 MMHG | TEMPERATURE: 97.1 F | HEIGHT: 63 IN | HEART RATE: 84 BPM | WEIGHT: 145.4 LBS

## 2018-05-31 DIAGNOSIS — L90.0 LICHEN SCLEROSUS: Primary | ICD-10-CM

## 2018-05-31 DIAGNOSIS — Z13.1 SCREENING FOR DIABETES MELLITUS: ICD-10-CM

## 2018-05-31 DIAGNOSIS — E06.3 HYPOTHYROIDISM DUE TO HASHIMOTO'S THYROIDITIS: ICD-10-CM

## 2018-05-31 LAB — TSH SERPL DL<=0.005 MIU/L-ACNC: 1.76 MU/L (ref 0.4–4)

## 2018-05-31 PROCEDURE — 99213 OFFICE O/P EST LOW 20 MIN: CPT | Performed by: FAMILY MEDICINE

## 2018-05-31 RX ORDER — CLOBETASOL PROPIONATE 0.5 MG/G
CREAM TOPICAL
Qty: 15 G | Refills: 1 | Status: SHIPPED | OUTPATIENT
Start: 2018-05-31 | End: 2019-05-17

## 2018-05-31 NOTE — MR AVS SNAPSHOT
"              After Visit Summary   5/31/2018    Kendra Montenegro    MRN: [id number]           Patient Information     Date Of Birth          1970        Visit Information        Provider Department      5/31/2018 2:00 PM Rosario Suazo,  [facility]        Today's Diagnoses     Hypothyroidism due to Hashimoto's thyroiditis    -  1    Lichen sclerosus          Care Instructions    Things look better, some nice improvement.  I think it is fine to use the clobetasol just twice weekly now.    I'll let you know the thyroid results when available.              Follow-ups after your visit        Your next 10 appointments already scheduled     Jun 05, 2018  7:45 AM CDT   MA SCREENING DIGITAL BILATERAL with [id number]   [facility])    [address] [facility] [address] [facility] [id number] [phone]           Do not use any powder, lotion or deodorant under your arms or on your breast. If you do, we will ask you to remove it before your exam.  Wear comfortable, two-piece clothing.  If you have any allergies, tell your care team.  Bring any previous mammograms from other facilities or have them mailed to the breast center. Three-dimensional (3D) mammograms are available at [place] locations in [facility], [place], [place], [facility], [place], [place], and [place]. [facility] locations include [place] [facility] in [place]. Benefits of 3D mammograms include: - Improved rate of cancer detection - Decreases your chance of having to go back for more tests, which means fewer: - \"False-positive\" results (This means that there is an abnormal area but it isn't cancer.) - Invasive testing procedures, such as a biopsy or surgery - Can provide clearer images of the breast if you have dense breast tissue. 3D mammography is an optional exam that anyone can have with a 2D mammogram. It doesn't replace or take the place of a 2D mammogram. 2D " "mammograms remain an effective screening test for all women.  Not all insurance companies cover the cost of a 3D mammogram. Check with your insurance.              Who to contact     Normal or non-critical lab and imaging results will be communicated to you by MyChart, letter or phone within 4 business days after the clinic has received the results. If you do not hear from us within 7 days, please contact the clinic through MyChart or phone. If you have a critical or abnormal lab result, we will notify you by phone as soon as possible.  Submit refill requests through MiniLuxe or call your pharmacy and they will forward the refill request to us. Please allow 3 business days for your refill to be completed.          If you need to speak with a  for additional information , please call: [phone]           Additional Information About Your Visit        Care EveryWhere ID     This is your Care EveryWhere ID. This could be used by other organizations to access your [place] medical records  [id number]        Your Vitals Were     Pulse Temperature Height Last Period Breastfeeding? BMI (Body Mass Index)    84 97.1  F (36.2  C) (Tympanic) 5' 2.75\" (1.594 m) 11/29/2015 (LMP Unknown) No 25.96 kg/m2       Blood Pressure from Last 3 Encounters:   05/31/18 114/78   03/26/18 114/72   04/21/17 122/80    Weight from Last 3 Encounters:   05/31/18 145 lb 6.4 oz (66 kg)   03/26/18 146 lb 6.4 oz (66.4 kg)   04/21/17 139 lb 9.6 oz (63.3 kg)              We Performed the Following     TSH          Today's Medication Changes          These changes are accurate as of 5/31/18  2:34 PM.  If you have any questions, ask your nurse or doctor.               These medicines have changed or have updated prescriptions.        Dose/Directions    clobetasol 0.05 % cream   Commonly known as:  TEMOVATE   This may have changed:  additional instructions   Used for:  Lichen sclerosus   Changed by:  Rosario Suazo, DO        Apply " sparingly to affected area twice weekly.    Do not apply to face.   Quantity:  15 g   Refills:  1            Where to get your medicines      These medications were sent to [facility] - [address]  AT [facility] & [address] , [place] [facility] [address]     Phone:  [phone]     clobetasol 0.05 % cream                Primary Care Provider Office Phone # Fax #    Rosario Nazario Gabriele,  [phone] [address] [place] [address]        Equal Access to Services     [facility]: Hadii aad ku hadasho Soomaali, waaxda luqadaha, qaybta kaalmada adeegyada, shauna nuno haymonica adebaljit pulido . So [facility] [phone].    ATENCIÓN: Si habla español, tiene a moncada disposición servicios gratuitos de asistencia lingüística. [facility] [phone].    We comply with applicable federal civil rights laws and [place] laws. We do not discriminate on the basis of race, color, national origin, age, disability, sex, sexual orientation, or gender identity.            Thank you!     Thank you for choosing [facility]  for your care. Our goal is always to provide you with excellent care. Hearing back from our patients is one way we can continue to improve our services. Please take a few minutes to complete the written survey that you may receive in the mail after your visit with us. Thank you!             Your Updated Medication List - Protect others around you: Learn how to safely use, store and throw away your medicines at www.disposemymeds.org.          This list is accurate as of 5/31/18  2:34 PM.  Always use your most recent med list.                   Brand Name Dispense Instructions for use Diagnosis    clobetasol 0.05 % cream    TEMOVATE    15 g    Apply sparingly to affected area twice weekly.    Do not apply to face.    Lichen sclerosus       levothyroxine 125 MCG tablet    SYNTHROID/LEVOTHROID    90 tablet    Take 1 tablet (125  mcg) by mouth daily    Hypothyroidism due to Hashimoto's thyroiditis       Multiple vitamin Tabs      Take 1 tablet by mouth daily

## 2018-05-31 NOTE — PATIENT INSTRUCTIONS
Things look better, some nice improvement.  I think it is fine to use the clobetasol just twice weekly now.    I'll let you know the thyroid results when available.

## 2018-05-31 NOTE — NURSING NOTE
"Initial /78  Pulse 84  Temp 97.1  F (36.2  C) (Tympanic)  Ht 5' 2.75\" (1.594 m)  Wt 145 lb 6.4 oz (66 kg)  LMP 11/29/2015 (LMP Unknown)  Breastfeeding? No  BMI 25.96 kg/m2 Estimated body mass index is 25.96 kg/(m^2) as calculated from the following:    Height as of this encounter: 5' 2.75\" (1.594 m).    Weight as of this encounter: 145 lb 6.4 oz (66 kg). .      "

## 2018-05-31 NOTE — PROGRESS NOTES
SUBJECTIVE:   Kendra Montenegro is a 47 year old female who presents to clinic today for the following health issues:    * f/u on lichen sclerosis    Feels things have been going well.  Notes she did at times have trouble remembering to use the cream twice weekly but is doing better now.  In hindsight feels she did perhaps have some itching and discomfort that she did not really recognize.    Has not yet tried the vaginal lubricant discussed last time for vaginal dryness.  Thinking about trying that    *  Also continuing to have trouble with weight gain and constipation.  Notes that in the past this has been true when her thyroid has been off.  Would like to have that rechecked today.      Problem list and histories reviewed & adjusted, as indicated.  Additional history: as documented      Reviewed and updated as needed this visit by clinical staff  Tobacco  Allergies  Meds  Med Hx  Surg Hx  Fam Hx  Soc Hx      Reviewed and updated as needed this visit by Provider  Tobacco  Med Hx  Surg Hx  Fam Hx  Soc Hx        ROS: Remainder of Constitutional, CV, Respiratory, GI,  negative with exception of that mentioned above    PE:  VS as above   Gen:  WN/WD/WH female in NAD   :  Almost complete resolution of leukoplakia of labia seen,  Labia minora remain atrophic but improved.  Some opening in the fuses clitoral lamb noted.    A?P:      ICD-10-CM    1. Lichen sclerosus L90.0 clobetasol (TEMOVATE) 0.05 % cream   2. Hypothyroidism due to Hashimoto's thyroiditis E03.8 TSH    E06.3    3. Screening for diabetes mellitus Z13.1      Patient Instructions   Things look better, some nice improvement.  I think it is fine to use the clobetasol just twice weekly now.    I'll let you know the thyroid results when available.        Has had good improvement in the chronic changes of lichen sclerosus.  Reviewed appropriate dosing of clobetasol and need to do regular exams and notify us for any changes in skin texture, areas of  discomfort or itching as these can be signs of vulvar cancer.  She verbalized understanding.  Will need to have yearly exams.

## 2018-05-31 NOTE — LETTER
June 1, 2018      Kendra Montenegro  [address] [address]        Dear MsZaire,    We are writing to inform you of your test results.    Your thyroid remains normal.     Resulted Orders   TSH   Result Value Ref Range    TSH 1.76 0.40 - 4.00 mU/L       If you have any questions or concerns, please call the clinic at the number listed above.       Sincerely,        Rosario Suazo, DO/am

## 2018-06-05 ENCOUNTER — HOSPITAL ENCOUNTER (OUTPATIENT)
Dept: MAMMOGRAPHY | Facility: CLINIC | Age: 48
Discharge: HOME | End: 2018-06-05
Attending: FAMILY MEDICINE | Admitting: FAMILY MEDICINE
Payer: COMMERCIAL

## 2018-06-05 DIAGNOSIS — Z12.31 VISIT FOR SCREENING MAMMOGRAM: ICD-10-CM

## 2018-06-05 PROCEDURE — 77067 SCR MAMMO BI INCL CAD: CPT

## 2018-10-10 ENCOUNTER — TELEPHONE (OUTPATIENT)
Dept: FAMILY MEDICINE | Facility: CLINIC | Age: 48
End: 2018-10-10

## 2018-10-10 NOTE — TELEPHONE ENCOUNTER
DX at urgent care with cellulitis on nose. States swelling spreading down face. Is currently taking antibiotics . She is requesting to speak with [facility].    Dinorah Yanez, [place]

## 2018-10-10 NOTE — TELEPHONE ENCOUNTER
I spoke to Kendra. She went to the [facility] yesterday and was diagnosed with cellulitis on the bridge of her nose. Today it has enlarged down to the side of her nose. She has taken 3 doses of Keflex. Temp 98.5    I recommended she give it a bit longer. Sometime it can take 48-72 hours for antibiotics to improve condition. If redness or discomfort increases rapidly she should be seen again sooner. Monitor temperature as well. Also offered appointment but patient felt she was comfortable getting in some more doses. She has a follow-up appointment with Dr Suazo on Friday. Carmita Lilly RN

## 2018-10-15 ENCOUNTER — OFFICE VISIT (OUTPATIENT)
Dept: FAMILY MEDICINE | Facility: CLINIC | Age: 48
End: 2018-10-15
Payer: COMMERCIAL

## 2018-10-15 ENCOUNTER — TELEPHONE (OUTPATIENT)
Dept: FAMILY MEDICINE | Facility: CLINIC | Age: 48
End: 2018-10-15

## 2018-10-15 ENCOUNTER — HOSPITAL ENCOUNTER (OUTPATIENT)
Dept: CT IMAGING | Facility: CLINIC | Age: 48
Discharge: HOME | End: 2018-10-15
Attending: FAMILY MEDICINE | Admitting: FAMILY MEDICINE
Payer: COMMERCIAL

## 2018-10-15 VITALS
HEART RATE: 84 BPM | TEMPERATURE: 97.5 F | DIASTOLIC BLOOD PRESSURE: 88 MMHG | BODY MASS INDEX: 25.02 KG/M2 | HEIGHT: 63 IN | SYSTOLIC BLOOD PRESSURE: 138 MMHG | WEIGHT: 141.2 LBS

## 2018-10-15 DIAGNOSIS — J32.0 CHRONIC MAXILLARY SINUSITIS: ICD-10-CM

## 2018-10-15 DIAGNOSIS — R22.0 LEFT FACIAL SWELLING: ICD-10-CM

## 2018-10-15 DIAGNOSIS — R22.0 LEFT FACIAL SWELLING: Primary | ICD-10-CM

## 2018-10-15 DIAGNOSIS — L03.211 FACIAL CELLULITIS: Primary | ICD-10-CM

## 2018-10-15 PROCEDURE — 25000128 H RX IP 250 OP 636: Performed by: RADIOLOGY

## 2018-10-15 PROCEDURE — 70487 CT MAXILLOFACIAL W/DYE: CPT

## 2018-10-15 PROCEDURE — 99213 OFFICE O/P EST LOW 20 MIN: CPT | Performed by: FAMILY MEDICINE

## 2018-10-15 PROCEDURE — 25000125 ZZHC RX 250: Performed by: RADIOLOGY

## 2018-10-15 RX ORDER — CEPHALEXIN 500 MG/1
500 CAPSULE ORAL 4 TIMES DAILY
COMMUNITY
Start: 2018-10-09 | End: 2018-10-15 | Stop reason: ALTCHOICE

## 2018-10-15 RX ORDER — IOPAMIDOL 755 MG/ML
80 INJECTION, SOLUTION INTRAVASCULAR ONCE
Status: COMPLETED | OUTPATIENT
Start: 2018-10-15 | End: 2018-10-15

## 2018-10-15 RX ORDER — SULFAMETHOXAZOLE/TRIMETHOPRIM 800-160 MG
1 TABLET ORAL 2 TIMES DAILY
Qty: 14 TABLET | Refills: 0 | Status: SHIPPED | OUTPATIENT
Start: 2018-10-15 | End: 2019-01-14

## 2018-10-15 RX ADMIN — SODIUM CHLORIDE 80 ML: 9 INJECTION, SOLUTION INTRAVENOUS at 13:24

## 2018-10-15 RX ADMIN — IOPAMIDOL 80 ML: 755 INJECTION, SOLUTION INTRAVENOUS at 13:24

## 2018-10-15 NOTE — MR AVS SNAPSHOT
After Visit Summary   10/15/2018    Kendra Montenegro    MRN: [id number]           Patient Information     Date Of Birth          1970        Visit Information        Provider Department      10/15/2018 9:00 AM Rosario Suazo,  [facility]        Today's Diagnoses     Left facial swelling    -  1      Care Instructions    We'll get the CT of the facial area and look for evidence of an abscess or fluid collection.  If this is negative we'll plan on a different antibiotic.  If positive we'll need to address how to drain the area.  You can expect a call from us later today with results.              Follow-ups after your visit        Your next 10 appointments already scheduled     Oct 15, 2018  1:30 PM CDT   CT SINUS WITHOUT & WITH CONTRAST with [facility]   [facility] CT ([facility])    [address] [address]   [phone]           How do I prepare for my exam? (Food and drink instructions) No Food and Drink Restrictions.  How do I prepare for my exam? (Other instructions) You do not need to do anything special to prepare for this exam. For a sinus scan: Use your nose spray (nasal decongestant spray) as directed.  What should I wear: Please wear loose clothing, such as a sweat suit or jogging clothes. Avoid snaps, zippers and other metal. We may ask you to undress and put on a hospital gown.  How long does the exam take: Most scans take less than 20 minutes.  What should I bring: Please bring any scans or X-rays taken at other hospitals, if similar tests were done. Also bring a list of your medicines, including vitamins, minerals and over-the-counter drugs. It is safest to leave personal items at home.  Do I need a : No  is needed.  What do I need to tell my doctor? Be sure to tell your doctor: * If you have any allergies. * If there s any chance you are pregnant. * If you are breastfeeding.  What should I do after the exam: No  "restrictions, You may resume normal activities.  What is this test: A CT (computed tomography) scan is a series of pictures that allows us to look inside your body. The scanner creates images of the body in cross sections, much like slices of bread. This helps us see any problems more clearly.  Who should I call with questions: If you have any questions, please call the Imaging Department where you will have your exam. Directions, parking instructions, and other information is available on our website, [email or web address].              Future tests that were ordered for you today     Open Future Orders        Priority Expected Expires Ordered    CT Sinus with & without Contrast STAT  10/15/2019 10/15/2018            Who to contact     Normal or non-critical lab and imaging results will be communicated to you by [employer] ([email or web address])hart, letter or phone within 4 business days after the clinic has received the results. If you do not hear from us within 7 days, please contact the clinic through [employer] ([email or web address])hart or phone. If you have a critical or abnormal lab result, we will notify you by phone as soon as possible.  Submit refill requests through ffk environment or call your pharmacy and they will forward the refill request to us. Please allow 3 business days for your refill to be completed.          If you need to speak with a  for additional information , please call: [phone]           Additional Information About Your Visit        ffk environment Information     ffk environment lets you send messages to your doctor, view your test results, renew your prescriptions, schedule appointments and more. To sign up, go to [email or web address] . Click on \"Log in\" on the left side of the screen, which will take you to the Welcome page. Then click on \"Sign up Now\" on the right side of the page.     You will be asked to enter the access code listed below, as well as some personal information. Please follow the directions to create your username and " "password.     Your access code is: [email or web address]  Expires: 2019 10:06 AM     Your access code will  in 90 days. If you need help or a new code, please call your [place] clinic or [phone].        Care EveryWhere ID     This is your Care EveryWhere ID. This could be used by other organizations to access your [place] medical records  [id number]        Your Vitals Were     Pulse Temperature Height Last Period Breastfeeding? BMI (Body Mass Index)    84 97.5  F (36.4  C) (Tympanic) 5' 2.75\" (1.594 m) 2015 (LMP Unknown) No 25.21 kg/m2       Blood Pressure from Last 3 Encounters:   10/15/18 138/88   18 114/78   18 114/72    Weight from Last 3 Encounters:   10/15/18 141 lb 3.2 oz (64 kg)   18 145 lb 6.4 oz (66 kg)   18 146 lb 6.4 oz (66.4 kg)               Primary Care Provider Office Phone # Fax #    Rosario Navarroangelica Suazo [phone] [address] [place] [address]        Equal Access to Services     KEATON PENG AH: Hadii yfn ku hadasho Soomaali, waaxda luqadaha, qaybta kaalmada adeegyada, waxay nurys haybelindan sunny gorman. So [facility] [phone].    ATENCIÓN: Si habla español, tiene a moncada disposición servicios gratuitos de asistencia lingüística. Llame al [phone].    We comply with applicable federal civil rights laws and [place] laws. We do not discriminate on the basis of race, color, national origin, age, disability, sex, sexual orientation, or gender identity.            Thank you!     Thank you for choosing [facility] [place]  for your care. Our goal is always to provide you with excellent care. Hearing back from our patients is one way we can continue to improve our services. Please take a few minutes to complete the written survey that you may receive in the mail after your visit with us. Thank you!             Your Updated Medication List - Protect others around you: Learn how to safely use, store and throw away your " medicines at www.disposemymeds.org.          This list is accurate as of 10/15/18 10:06 AM.  Always use your most recent med list.                   Brand Name Dispense Instructions for use Diagnosis    cephALEXin 500 MG capsule    KEFLEX     Take 500 mg by mouth 4 times daily        clobetasol 0.05 % cream    TEMOVATE    15 g    Apply sparingly to affected area twice weekly.    Do not apply to face.    Lichen sclerosus       levothyroxine 125 MCG tablet    SYNTHROID/LEVOTHROID    90 tablet    TAKE 1 TABLET BY MOUTH DAILY    Hypothyroidism due to Hashimoto's thyroiditis       Multiple vitamin Tabs      Take 1 tablet by mouth daily

## 2018-10-15 NOTE — NURSING NOTE
"Initial /88  Pulse 84  Temp 97.5  F (36.4  C) (Tympanic)  Ht 5' 2.75\" (1.594 m)  Wt 141 lb 3.2 oz (64 kg)  LMP 11/29/2015 (LMP Unknown)  Breastfeeding? No  BMI 25.21 kg/m2 Estimated body mass index is 25.21 kg/(m^2) as calculated from the following:    Height as of this encounter: 5' 2.75\" (1.594 m).    Weight as of this encounter: 141 lb 3.2 oz (64 kg). .      "

## 2018-10-15 NOTE — TELEPHONE ENCOUNTER
Please call Kendra.  CT scan did not show and abscess but does look like ongoing infection in the skin/soft tissue.  I would recommend she stop the keflex and begin the bactrim instead as we discussed.  I would expect her to see improvement over the next 48 hours.  If she develops fever or feels things are acutely worsening she should seek emergent care.    The radiologist also mentioned some chronic disease in the R sinus.  I would recommend she visit with ENT to discuss this.  Referral was placed.    Rosario Suazo

## 2018-10-15 NOTE — PROGRESS NOTES
SUBJECTIVE:   Kendra Montenegro is a 48 year old female who presents to clinic today for the following health issues:    ED/ Followup:    Facility:  [facility]  Date of visit: 10/9/18 and 10/7/18  Reason for visit: facial swelling and ear pain  Current Status: still has swelling and pressure in ears      Went to  on Tuesday due to swelling across the bridge of her nose.  Had swelling on both sides of the nose.  Was started on cephalexin on Tuesday.    On Wednesday felt like swelling was a bit worse.  Things were better on Thursday and Friday.  Felt things were graudally improving until this AM when she woke up this AM felt swelling was worse on the L side.    Has not had any fever.  Has pain along the side of the nose.    Feels like nose is congested on the L side due to swelling.  Does not feel like there has been any significant redness.      Still has 3-4 days of antibiotic left.    Problem list and histories reviewed & adjusted, as indicated.  Additional history: as documented    Reviewed and updated as needed this visit by clinical staff  Tobacco  Allergies  Med Hx  Surg Hx  Fam Hx  Soc Hx      Reviewed and updated as needed this visit by Provider  Tobacco  Med Hx  Surg Hx  Fam Hx  Soc Hx        ROS: Remainder of Constitutional, CV, Respiratory, GI,  negative with exception of that mentioned above    PE:  VS as above   Gen:  WN/WD/WH female in NAD   Face:  Swelling on L side of face at bridge of nose and ala as well as extending along the maxilla L.  Mild erythema, faint  Tender on palpation.  L ala with swollen area/mass, firm, tender.  Cannot appreciated any fluctuance.     Neck:  supple, no LAD appreciated    A/p:      ICD-10-CM    1. Left facial swelling R22.0 CT Facial Bones with Contrast     CANCELED: CT Sinus with & without Contrast     Patient Instructions   We'll get the CT of the facial area and look for evidence of an abscess or  fluid collection.  If this is negative we'll plan on a different antibiotic.  If positive we'll need to address how to drain the area.  You can expect a call from us later today with results.        Suspect this is infectious/cellulitis.  Given inital improvement then worsening concern for possible underlying abscess.  Plan imaging today to eval

## 2018-10-15 NOTE — PATIENT INSTRUCTIONS
We'll get the CT of the facial area and look for evidence of an abscess or fluid collection.  If this is negative we'll plan on a different antibiotic.  If positive we'll need to address how to drain the area.  You can expect a call from us later today with results.

## 2018-10-15 NOTE — TELEPHONE ENCOUNTER
Call placed to patient. Relayed Dr Suazo's note and recommendation.  Discussed new antibiotic. Phone number given to patient to schedule ENT appointment per referral.  Clotilde ADAMS/CECILIA

## 2018-10-16 NOTE — TELEPHONE ENCOUNTER
"Patient returned call.   Patient stated when her  asked questions regarding results of CT, she couldn't recall our conversation.  This writer read result notes of CT to patient, questions answered, reminded of ENT referral.  Patient verbalizes understanding.    Reports feeling \"ok\" today, denies pain, does feel congested sinus.  Reviewed criteria for urgent assessment at ED:fever, increased pain, difficulty breathing.  Clotilde Zimmerman RN LL/HU    "

## 2018-10-23 ENCOUNTER — HOSPITAL ENCOUNTER (EMERGENCY)
Facility: CLINIC | Age: 48
Discharge: HOME | End: 2018-10-23
Attending: PHYSICIAN ASSISTANT | Admitting: PHYSICIAN ASSISTANT
Payer: COMMERCIAL

## 2018-10-23 ENCOUNTER — NURSE TRIAGE (OUTPATIENT)
Dept: NURSING | Facility: CLINIC | Age: 48
End: 2018-10-23

## 2018-10-23 VITALS
HEIGHT: 62 IN | OXYGEN SATURATION: 97 % | TEMPERATURE: 98 F | HEART RATE: 93 BPM | WEIGHT: 139 LBS | SYSTOLIC BLOOD PRESSURE: 143 MMHG | RESPIRATION RATE: 98 BRPM | BODY MASS INDEX: 25.58 KG/M2 | DIASTOLIC BLOOD PRESSURE: 91 MMHG

## 2018-10-23 DIAGNOSIS — J01.00 ACUTE MAXILLARY SINUSITIS, RECURRENCE NOT SPECIFIED: ICD-10-CM

## 2018-10-23 DIAGNOSIS — L03.211 FACIAL CELLULITIS: ICD-10-CM

## 2018-10-23 PROCEDURE — 99283 EMERGENCY DEPT VISIT LOW MDM: CPT | Performed by: PHYSICIAN ASSISTANT

## 2018-10-23 PROCEDURE — 99284 EMERGENCY DEPT VISIT MOD MDM: CPT | Mod: Z6 | Performed by: PHYSICIAN ASSISTANT

## 2018-10-23 RX ORDER — DOXYCYCLINE 100 MG/1
100 CAPSULE ORAL 2 TIMES DAILY
Qty: 28 CAPSULE | Refills: 0 | Status: SHIPPED | OUTPATIENT
Start: 2018-10-23 | End: 2018-11-06

## 2018-10-23 NOTE — ED AVS SNAPSHOT
[facility] Emergency Department    [address] [address]    Phone:  [phone]    Fax:  [phone]                                       Kendra Montenegro   MRN: [id number]    Department:  [facility] Emergency Department   Date of Visit:  10/23/2018           After Visit Summary Signature Page     I have received my discharge instructions, and my questions have been answered. I have discussed any challenges I see with this plan with the nurse or doctor.    ..........................................................................................................................................  Patient/Patient Representative Signature      ..........................................................................................................................................  Patient Representative Print Name and Relationship to Patient    ..................................................               ................................................  Date                                   Time    ..........................................................................................................................................  Reviewed by Signature/Title    ...................................................              ..............................................  Date                                               Time          22EPIC Rev 08/18

## 2018-10-23 NOTE — ED PROVIDER NOTES
History     Chief Complaint   Patient presents with     Cellulitis     bridge of nose.  failing on OP therapy     HPI  Kendra Montenegro is a 48 year old female who presents with complaints of swelling, redness, and pain overlying the bridge of her nose and into her cheeks.  Patient also complains of associated sinus pressure, achiness throughout her upper teeth, and nasal congestion.  Patient was diagnosed with a facial cellulitis on 10/9/18 and was placed on Keflex.  She states prior to this evaluation, she had developed nasal congestion and subsequently developed swelling, pain, and redness across her nose and cheeks.  Patient states she took the Keflex without any improvement in her symptoms and was subsequently seen in clinic on 10/15/18 at which point she had a CT which showed cellulitic findings over her left cheek and mucosal thickening of this left maxillary sinus.  There was no discrete abscess or fluid collection noted in the area.  Patient was switched to a 7-day course of Bactrim which she finished last night.  She states she was seeing improvement in her symptoms while taking the Bactrim but had worsening of her symptoms again this morning.  Denies fevers, chills, or cough.  She has an appointment with ENT next week.      Facial bone CT with contrast on 10/15/18:  IMPRESSION:  1. Soft tissue thickening of the left nasal ala. No discrete abscess  is seen in this region. The appearance is nonspecific. It could be due  to an infectious or inflammatory process.   2. Mild induration of the subcutaneous fat over the left cheek. This  could be due to cellulitis.  3. Right maxillary sinus is small and appears atelectatic. The  appearance raises the possibility of a silent sinus syndrome or  maxillary sinus atelectasis.  4. Mucosal thickening left maxillary sinus.         Problem List:    Patient Active Problem List    Diagnosis Date Noted     Thyroid nodule 04/21/2017     Priority: Medium     Anemia 12/04/2015      Priority: Medium     CARDIOVASCULAR SCREENING; LDL GOAL LESS THAN 160 10/05/2015     Priority: Medium     Iron deficiency anemia due to chronic blood loss 09/17/2015     Priority: Medium     Thyromegaly 12/04/2014     Priority: Medium     Hypothyroidism 12/04/2014     Priority: Medium        Past Medical History:    Past Medical History:   Diagnosis Date     Hx of abnormal Pap smear ??       Past Surgical History:    Past Surgical History:   Procedure Laterality Date     CYSTOSCOPY N/A 1/11/2016    Procedure: CYSTOSCOPY;  Surgeon: Juan Jose Burgess MD;  Location: [facility]     DILATION AND CURETTAGE, HYSTEROSCOPY DIAGNOSTIC, COMBINED N/A 10/2/2015    Procedure: COMBINED DILATION AND CURETTAGE, HYSTEROSCOPY DIAGNOSTIC;  Surgeon: Juan Jose Burgess MD;  Location: [facility] OR     HYSTERECTOMY, PAP NO LONGER INDICATED  1/11/2016     LAPAROSCOPIC HYSTERECTOMY TOTAL, BILATERAL SALPINGO-OOPHORECTOMY, COMBINED N/A 1/11/2016    Procedure: COMBINED LAPAROSCOPIC HYSTERECTOMY TOTAL, SALPINGO-OOPHORECTOMY;  Surgeon: Juan Jose Burgess MD;  Location: [facility] OR     SURGICAL HISTORY OF -   1999    removal of endometriosis,L ovary and a fallopian tube     TUBAL LIGATION         Family History:    No family history on file.    Social History:  Marital Status:   [2]  Social History   Substance Use Topics     Smoking status: Never Smoker     Smokeless tobacco: Never Used     Alcohol use No        Medications:      doxycycline (VIBRAMYCIN) 100 MG capsule   clobetasol (TEMOVATE) 0.05 % cream   levothyroxine (SYNTHROID/LEVOTHROID) 125 MCG tablet   Multiple vitamin TABS   sulfamethoxazole-trimethoprim (BACTRIM DS/SEPTRA DS) 800-160 MG per tablet         Review of Systems   Constitutional: Negative.    HENT: Positive for congestion, facial swelling, postnasal drip, sinus pain and sinus pressure.         Upper dental pain   Respiratory: Negative.    Cardiovascular: Negative.    Musculoskeletal: Negative.    Skin:        Redness to  "skin overlying bridge of nose and left cheek   Neurological: Negative.    All other systems reviewed and are negative.      Physical Exam   BP: (!) 143/91  Pulse: 93  Temp: 98  F (36.7  C)  Resp: (!) 98  Height: 157.5 cm (5' 2\")  Weight: 63 kg (139 lb)  SpO2: 97 %      Physical Exam   Constitutional: She is oriented to person, place, and time. She appears well-developed and well-nourished.  Non-toxic appearance. No distress.   HENT:   Head: Normocephalic and atraumatic.   Right Ear: Tympanic membrane, external ear and ear canal normal.   Left Ear: Tympanic membrane, external ear and ear canal normal.   Nose: Mucosal edema (With significant congestion) present. Right sinus exhibits maxillary sinus tenderness. Right sinus exhibits no frontal sinus tenderness. Left sinus exhibits maxillary sinus tenderness. Left sinus exhibits no frontal sinus tenderness.   Mouth/Throat: Uvula is midline, oropharynx is clear and moist and mucous membranes are normal. No trismus in the jaw. Normal dentition. No dental abscesses or uvula swelling. No oropharyngeal exudate, posterior oropharyngeal edema, posterior oropharyngeal erythema or tonsillar abscesses.   Tenderness, swelling, and erythema across bridge of nose and overlying left maxilla.  No area of fluctuance or palpable abscess.     Eyes: Conjunctivae and EOM are normal. Pupils are equal, round, and reactive to light.   Neck: Normal range of motion and full passive range of motion without pain. Neck supple. No rigidity. No edema, no erythema and normal range of motion present. No Brudzinski's sign and no Kernig's sign noted.   Cardiovascular: Normal rate, regular rhythm and normal heart sounds.    Pulmonary/Chest: Effort normal and breath sounds normal. No stridor. No respiratory distress. She has no wheezes.   Musculoskeletal: Normal range of motion.   Lymphadenopathy:     She has no cervical adenopathy.   Neurological: She is alert and oriented to person, place, and time. "   Skin: Skin is warm and dry. No rash noted.       ED Course     ED Course     Procedures    No results found for this or any previous visit (from the past 24 hour(s)).    Medications - No data to display    Assessments & Plan (with Medical Decision Making)     Pt is a 48 year old female who presents with complaints of swelling, redness, and pain overlying the bridge of her nose and into her cheeks.  Patient also complains of associated sinus pressure, achiness throughout her upper teeth, and nasal congestion.  Patient was diagnosed with a facial cellulitis on 10/9/18 and was placed on Keflex.  She states prior to this evaluation, she had developed nasal congestion and subsequently developed swelling, pain, and redness across her nose and cheeks.  Patient states she took the Keflex without any improvement in her symptoms and was subsequently seen in clinic on 10/15/18 at which point she had a CT which showed cellulitic findings over her left cheek and mucosal thickening of this left maxillary sinus.  There was no discrete abscess or fluid collection noted in the area.  Patient was switched to a 7-day course of Bactrim which she finished last night.  She states she was seeing improvement in her symptoms while taking the Bactrim but had worsening of her symptoms again this morning.  Pt is afebrile on arrival.  Exam as above.  Discussed patient's case with ENT provider on-call, Dr. Bhatia, who reviewed patient's CT scan and recommended switching her antibiotic to Doxycycline for 14 days.  He will see patient in 1 week as scheduled or sooner if needed for follow-up.  He does not recommend repeating CT imaging at this time as patient does not have any identifiable evidence of abscess on exam.  He states that patient clearly has a soft tissue infection noted on CT scan, but he does not see any clear communication between the sinuses in this area and the soft tissue.  Discussed this plan with patient, and she is in  agreement.  Encouraged use of Sudafed and Afrin (for no more than 3 days) for symptomatic treatment of sinus symptoms.  Return precautions were reviewed.  Hand-outs were provided.    Patient was sent with Doxycycline and was instructed to follow-up with ENT as scheduled in 1 week for continued care and management or sooner if new or worsening symptoms.  She is to return to the ED for persistent and/or worsening symptoms.  Patient expressed understanding of the diagnosis and plan and was discharged home in good condition.    I have reviewed the nursing notes.    I have reviewed the findings, diagnosis, plan and need for follow up with the patient.    New Prescriptions    DOXYCYCLINE (VIBRAMYCIN) 100 MG CAPSULE    Take 1 capsule (100 mg) by mouth 2 times daily for 14 days       Final diagnoses:   Facial cellulitis   Acute maxillary sinusitis, recurrence not specified       10/23/2018   [facility] EMERGENCY DEPARTMENT      Disclaimer:  This note consists of symbols derived from keyboarding, dictation and/or voice recognition software.  As a result, there may be errors in the script that have gone undetected.  Please consider this when interpreting information found in this chart.     Regina Claudio PA-C  10/24/18 2001

## 2018-10-23 NOTE — ED AVS SNAPSHOT
[facility] Emergency Department    [facility] [address]    Phone:  [phone]    Fax:  [phone]                                       Kendra Montenegro   MRN: [id number]    Department:  [facility] Emergency Department   Date of Visit:  10/23/2018           Patient Information     Date Of Birth          1970        Your diagnoses for this visit were:     Facial cellulitis     Acute maxillary sinusitis, recurrence not specified        You were seen by Yuko Richardson PA-C and Regina Claudio PA-C.      Follow-up Information     Follow up with Tonny Bhatia MD On 10/30/2018.    Specialty:  Otolaryngology    Why:  For follow-up or sooner if needed    Contact information:    [address] [place] [address]  [phone]          Follow up with [facility].    Specialty:  EMERGENCY MEDICINE    Why:  As needed, If symptoms worsen    Contact information:    [address] [place] [address] [phone]    Additional information:    The medical center is located at   [address] [place] [address] (between [facility] and   [facility] in [place], four miles north   of [place]).        Discharge Instructions         Facial Cellulitis  Cellulitis is an infection of the deep layers of skin. A break in the skin, such as a cut or scratch, can let bacteria under the skin. It may also occur from an infected oil gland (pimple) or hair follicle. If the bacteria get to deep layers of the skin, it can be serious. If not treated, cellulitis can get into the bloodstream and lymph nodes. The infection can then spread throughout the body. This causes serious illness.  Cellulitis causes the affected skin to become red, swollen, warm, and sore. The reddened areas have a visible border. You may have a fever, chills, and pain.  Cellulitis is treated with antibiotics taken for 7 to 10 days. Symptoms should get better 1 to 2 days after treatment is started.  Make sure to take all the antibiotics for the full number of days until they are gone. Keep taking the medication even if your symptoms go away.  Home care  Follow these tips:    Take all of the antibiotic medicine exactly as directed until it is gone. Don t miss any doses, especially during the first 7 days. Don t stop taking it when your symptoms get better.    Use a cool compress (face cloth soaked in cool water) on your face to help reduce swelling and pain.    You may use acetaminophen or ibuprofen to reduce pain. Don t use these if you have chronic liver or kidney disease, or ever had a stomach ulcer or gastrointestinal bleeding. Talk with your healthcare provider first.  Follow-up care  Follow up with your healthcare provider, or as advised. If your infection does not go away on the first antibiotic, your healthcare provider will prescribe a different one.  When to seek medical advice  Call your healthcare provider right away if any of these occur:    Fever higher of 100.4  F (38.0  C) or higher after 2 days on antibiotics    Red areas that spread    Swelling or pain that gets worse    Fluid leaking from the skin (pus)    An eyelid that swells shut or leaks fluid (pus)    Headache or neck pain that gets worse    Unusual drowsiness or confusion    Convulsions (seizure)    Change in eyesight     Date Last Reviewed: 9/1/2016 2000-2017 [employer]. [address] [place] [address], [place] [address]. All rights reserved. This information is not intended as a substitute for professional medical care. Always follow your healthcare professional's instructions.          Discharge References/Attachments     SINUSITIS, ACUTE (ENGLISH)      Your next 10 appointments already scheduled     Oct 30, 2018  8:45 AM CDT   New Visit with [facility]ny [facility] MD   [facility]    [address] [place] [address] [phone]              24 Hour Appointment Hotline        To make an appointment at any [facility], call [phone] ([phone]). If you don't have a family doctor or clinic, we will help you find one. [facility] clinics are conveniently located to serve the needs of you and your family.             Review of your medicines      START taking        Dose / Directions Last dose taken    doxycycline 100 MG capsule   Commonly known as:  VIBRAMYCIN   Dose:  100 mg   Quantity:  28 capsule        Take 1 capsule (100 mg) by mouth 2 times daily for 14 days   Refills:  0          Our records show that you are taking the medicines listed below. If these are incorrect, please call your family doctor or clinic.        Dose / Directions Last dose taken    clobetasol 0.05 % cream   Commonly known as:  TEMOVATE   Quantity:  15 g        Apply sparingly to affected area twice weekly.    Do not apply to face.   Refills:  1        levothyroxine 125 MCG tablet   Commonly known as:  SYNTHROID/LEVOTHROID   Quantity:  90 tablet        TAKE 1 TABLET BY MOUTH DAILY   Refills:  1        Multiple vitamin Tabs   Dose:  1 tablet        Take 1 tablet by mouth daily   Refills:  0        sulfamethoxazole-trimethoprim 800-160 MG per tablet   Commonly known as:  BACTRIM DS/SEPTRA DS   Dose:  1 tablet   Quantity:  14 tablet        Take 1 tablet by mouth 2 times daily   Refills:  0                Prescriptions were sent or printed at these locations (1 Prescription)                   [facility] [address] [place] [address] AT [address] [address]    Telephone:  [phone]   Fax:  [phone]   Hours:                  E-Prescribed (1 of 1)         doxycycline (VIBRAMYCIN) 100 MG capsule                Orders Needing Specimen Collection     None      Pending Results     No orders found from 10/21/2018 to 10/24/2018.            Pending Culture Results     No orders found from 10/21/2018 to 10/24/2018.            Pending  "Results Instructions     If you had any lab results that were not finalized at the time of your Discharge, you can call the ED Lab Result RN at [phone]. You will be contacted by this team for any positive Lab results or changes in treatment. The nurses are available 7 days a week from 10A to 6:30P.  You can leave a message 24 hours per day and they will return your call.        Test Results From Your Hospital Stay               Thank you for choosing [facility]       Thank you for choosing [facility] for your care. Our goal is always to provide you with excellent care. Hearing back from our patients is one way we can continue to improve our services. Please take a few minutes to complete the written survey that you may receive in the mail after you visit with us. Thank you!        [employer] Information     [employer] lets you send messages to your doctor, view your test results, renew your prescriptions, schedule appointments and more. To sign up, go to [email or web address] . Click on \"Log in\" on the left side of the screen, which will take you to the Welcome page. Then click on \"Sign up Now\" on the right side of the page.     You will be asked to enter the access code listed below, as well as some personal information. Please follow the directions to create your username and password.     Your access code is: [email or web address]  Expires: 2019 10:06 AM     Your access code will  in 90 days. If you need help or a new code, please call your [facility] clinic or [phone].        Care EveryWhere ID     This is your Care EveryWhere ID. This could be used by other organizations to access your [facility] medical records  [id number]        Equal Access to Services     [place] PENG : Marianne Brothers, thierry polanco, shauna gardner. So [facility] [phone].    ATENCIÓN: Si habla español, tiene a moncada disposición servicios gratuitos de asistencia " laxmi Ngmiguel al [phone].    We comply with applicable federal civil rights laws and [place] laws. We do not discriminate on the basis of race, color, national origin, age, disability, sex, sexual orientation, or gender identity.            After Visit Summary       This is your record. Keep this with you and show to your community pharmacist(s) and doctor(s) at your next visit.

## 2018-10-23 NOTE — TELEPHONE ENCOUNTER
Kendra has been in two different times in the past two weeks and is having facial swelling.  Today swelling on left side.   Kendra was diagnosed with skin cellulitis through urgency room.  CT was done and no pocket of infection.  ENT appointment next week Tuesday.  Given a stronger antibiotic was told if still symptoms get worse MD Suazo states that she  should go to ED.  Today face is swelling again.    FNA advised to go to ED and Kendra agreed.

## 2018-10-23 NOTE — DISCHARGE INSTRUCTIONS
Facial Cellulitis  Cellulitis is an infection of the deep layers of skin. A break in the skin, such as a cut or scratch, can let bacteria under the skin. It may also occur from an infected oil gland (pimple) or hair follicle. If the bacteria get to deep layers of the skin, it can be serious. If not treated, cellulitis can get into the bloodstream and lymph nodes. The infection can then spread throughout the body. This causes serious illness.  Cellulitis causes the affected skin to become red, swollen, warm, and sore. The reddened areas have a visible border. You may have a fever, chills, and pain.  Cellulitis is treated with antibiotics taken for 7 to 10 days. Symptoms should get better 1 to 2 days after treatment is started. Make sure to take all the antibiotics for the full number of days until they are gone. Keep taking the medication even if your symptoms go away.  Home care  Follow these tips:    Take all of the antibiotic medicine exactly as directed until it is gone. Don t miss any doses, especially during the first 7 days. Don t stop taking it when your symptoms get better.    Use a cool compress (face cloth soaked in cool water) on your face to help reduce swelling and pain.    You may use acetaminophen or ibuprofen to reduce pain. Don t use these if you have chronic liver or kidney disease, or ever had a stomach ulcer or gastrointestinal bleeding. Talk with your healthcare provider first.  Follow-up care  Follow up with your healthcare provider, or as advised. If your infection does not go away on the first antibiotic, your healthcare provider will prescribe a different one.  When to seek medical advice  Call your healthcare provider right away if any of these occur:    Fever higher of 100.4  F (38.0  C) or higher after 2 days on antibiotics    Red areas that spread    Swelling or pain that gets worse    Fluid leaking from the skin (pus)    An eyelid that swells shut or leaks fluid (pus)    Headache or  neck pain that gets worse    Unusual drowsiness or confusion    Convulsions (seizure)    Change in eyesight     Date Last Reviewed: 9/1/2016 2000-2017 [employer]. [address] [place] [address], [place] [address]. All rights reserved. This information is not intended as a substitute for professional medical care. Always follow your healthcare professional's instructions.

## 2018-10-24 ENCOUNTER — TELEPHONE (OUTPATIENT)
Dept: FAMILY MEDICINE | Facility: CLINIC | Age: 48
End: 2018-10-24

## 2018-10-24 ASSESSMENT — ENCOUNTER SYMPTOMS
FACIAL SWELLING: 1
CONSTITUTIONAL NEGATIVE: 1
SINUS PAIN: 1
RESPIRATORY NEGATIVE: 1
MUSCULOSKELETAL NEGATIVE: 1
NEUROLOGICAL NEGATIVE: 1
SINUS PRESSURE: 1
CARDIOVASCULAR NEGATIVE: 1

## 2018-10-24 NOTE — TELEPHONE ENCOUNTER
Left message at second number when first was not answered to return call. Please let her know Dr Suazo is out for today but I can talk with her if she would like. Carmita Lilly RN

## 2018-10-24 NOTE — TELEPHONE ENCOUNTER
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     Sylvia Puri, [place]

## 2018-10-28 ENCOUNTER — NURSE TRIAGE (OUTPATIENT)
Dept: NURSING | Facility: CLINIC | Age: 48
End: 2018-10-28

## 2018-10-28 NOTE — TELEPHONE ENCOUNTER
"See frequent calls / visits for ongoing cellulitis / swelling of face.  Currently on 3rd course of abx with minimal improvement.  Today on day \"4 or 5\" of Doxycycline, side of nose that was swollen has not decreased and other side of nose now swollen.  Otherwise, reports area is \"not as tender to the touch\".  Awoke \"feeling pretty good\", but overall reports generalized fatigue with recent illness.  ENT appt on Tuesday.  Reasons to be seen today reviewed, otherwise to f/u in clinic within 24 hours and to continue with plan of care in the interim period.    Reason for Disposition    [1] Taking antibiotic > 24 hours AND [2] cellulitis symptoms are WORSE (e.g., spreading redness, pain, swelling)    Protocols used: CELLULITIS ON ANTIBIOTIC FOLLOW-UP CALL-ADULT-    "

## 2018-10-30 ENCOUNTER — OFFICE VISIT (OUTPATIENT)
Dept: OTOLARYNGOLOGY | Facility: CLINIC | Age: 48
End: 2018-10-30
Payer: COMMERCIAL

## 2018-10-30 VITALS
HEIGHT: 63 IN | HEART RATE: 77 BPM | OXYGEN SATURATION: 96 % | BODY MASS INDEX: 24.63 KG/M2 | WEIGHT: 139 LBS | DIASTOLIC BLOOD PRESSURE: 72 MMHG | SYSTOLIC BLOOD PRESSURE: 111 MMHG | RESPIRATION RATE: 16 BRPM | TEMPERATURE: 97 F

## 2018-10-30 DIAGNOSIS — J32.0 CHRONIC MAXILLARY SINUSITIS: Primary | ICD-10-CM

## 2018-10-30 DIAGNOSIS — J33.9 NASAL POLYPS: ICD-10-CM

## 2018-10-30 PROCEDURE — 99203 OFFICE O/P NEW LOW 30 MIN: CPT | Performed by: OTOLARYNGOLOGY

## 2018-10-30 ASSESSMENT — PAIN SCALES - GENERAL: PAINLEVEL: NO PAIN (0)

## 2018-10-30 NOTE — MR AVS SNAPSHOT
After Visit Summary   10/30/2018    Kendra Montenegro    MRN: [id number]           Patient Information     Date Of Birth          1970        Visit Information        Provider Department      10/30/2018 8:45 AM Tonny Bhatia MD [facility]        Today's Diagnoses     Chronic maxillary sinusitis    -  1    Nasal polyps          Care Instructions    General Scheduling Information  To schedule your CT/MRI scan, please contact [place] [facility] at [phone] [address] [place] [address]    To schedule your Surgery, please contact our Specialty Schedulers at [phone]    ENT Clinic Locations Clinic Hours Telephone Number     [facility]  [address]  [place] [address]   Tuesday:       8:00am -- 4:00pm    Wednesday:  8:00am - 4:00pm   To schedule an appointment with   Dr. Bhatia,   please contact our   Specialty Scheduling Department at:     [phone]       [facility]  [address] [place] [address]   Friday:          8:00am - 4:00pm         Urgent Care Locations Clinic Hours Telephone Numbers     [facility]  [address] [place] [address]     Monday-Friday:     11:00pm - 9:00pm    Saturday-Sunday:  9:00am - 5:00pm   [phone]     [facility]  [address] [place] [address]     Monday-Friday:      5:00pm - 9:00pm     Saturday-Sunday:  9:00am - 5:00pm   [phone]                 Follow-ups after your visit        Who to contact     If you have questions or need follow up information about today's clinic visit or your schedule please contact [facility] directly at [phone].  Normal or non-critical lab and imaging results will be communicated to you by MyChart, letter or phone within 4 business days after the clinic has received the results. If you do not hear from us within 7 days, please contact the clinic through MyChart or phone. If you have a critical or abnormal lab  "result, we will notify you by phone as soon as possible.  Submit refill requests through [employer] or call your pharmacy and they will forward the refill request to us. Please allow 3 business days for your refill to be completed.          Additional Information About Your Visit        [employer] Information     [employer] lets you send messages to your doctor, view your test results, renew your prescriptions, schedule appointments and more. To sign up, go to [email or web address] . Click on \"Log in\" on the left side of the screen, which will take you to the Welcome page. Then click on \"Sign up Now\" on the right side of the page.     You will be asked to enter the access code listed below, as well as some personal information. Please follow the directions to create your username and password.     Your access code is: [email or web address]  Expires: 2019 10:06 AM     Your access code will  in 90 days. If you need help or a new code, please call your [place] clinic or [phone].        Care EveryWhere ID     This is your Care EveryWhere ID. This could be used by other organizations to access your [place] medical records  [id number]        Your Vitals Were     Pulse Temperature Respirations Height Last Period Pulse Oximetry    77 97  F (36.1  C) 16 1.594 m (5' 2.75\") 2015 (LMP Unknown) 96%    BMI (Body Mass Index)                   24.82 kg/m2            Blood Pressure from Last 3 Encounters:   10/30/18 111/72   10/23/18 (!) 143/91   10/15/18 138/88    Weight from Last 3 Encounters:   10/30/18 63 kg (139 lb)   10/23/18 63 kg (139 lb)   10/15/18 64 kg (141 lb 3.2 oz)              Today, you had the following     No orders found for display       Primary Care Provider Office Phone # Fax #    Rosario Suazo [phone] [address] [place] [address]        Equal Access to Services     [facility]: Hadii yfn Brothers, thierry polanco, qaybta kaalshauna powell " nurys darlingrhonda la'aan ah. So [facility] [phone].    ATENCIÓN: Si tiny gary, tiene a moncada disposición servicios gratuitos de asistencia lingüística. April al [phone].    We comply with applicable federal civil rights laws and [place] laws. We do not discriminate on the basis of race, color, national origin, age, disability, sex, sexual orientation, or gender identity.            Thank you!     [facility] you for choosing [facility]  [facility]. Our goal is always to provide you with excellent care. Hearing back from our patients is one way we can continue to improve our services. Please take a few minutes to complete the written survey that you may receive in the mail after your visit with us. Thank you!             Your Updated Medication List - Protect others around you: Learn how to safely use, store and throw away your medicines at www.disposemymeds.org.          This list is accurate as of 10/30/18  9:26 AM.  Always use your most recent med list.                   Brand Name Dispense Instructions for use Diagnosis    clobetasol 0.05 % cream    TEMOVATE    15 g    Apply sparingly to affected area twice weekly.    Do not apply to face.    Lichen sclerosus       doxycycline 100 MG capsule    VIBRAMYCIN    28 capsule    Take 1 capsule (100 mg) by mouth 2 times daily for 14 days        levothyroxine 125 MCG tablet    SYNTHROID/LEVOTHROID    90 tablet    TAKE 1 TABLET BY MOUTH DAILY    Hypothyroidism due to Hashimoto's thyroiditis       Multiple vitamin Tabs      Take 1 tablet by mouth daily        sulfamethoxazole-trimethoprim 800-160 MG per tablet    BACTRIM DS/SEPTRA DS    14 tablet    Take 1 tablet by mouth 2 times daily    Facial cellulitis

## 2018-10-30 NOTE — LETTER
10/30/2018         RE: Kendra Montenegro  [address]  [facility] [address]        Dear Colleague,    Thank you for referring your patient, Kendra Monteengro, to the [facility]. Please see a copy of my visit note below.    Chief Complaint - skin infection    History of Present Illness - Kendra Montenegro is a 48 year old female presents with a nasal infection. The patient has noticed for approximately 1 month. It started sudden with nasal congestion, nasal swelling (bridge of nose). She started keflex, didn't help. Left face then swelled. She took bactrim, it helped, but didn't resolve. It was painful. Then took doxycycline. She is better. No pain. Swelling improved. The patient hasn't had anything like this before. Nonsmoker. No lumps or swollen glands in the neck. Has been on keflex, bactrim, and doxycycline. CT sinus (10/15/2018), images reviewed, showed no abscess or deeper tissue infection. 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 She has bilateral middle turbinate polyps, small. No nasal drainage. No cough. No trauma or bug bites.     Past Medical History -   Patient Active Problem List   Diagnosis     Thyromegaly     Hypothyroidism     Iron deficiency anemia due to chronic blood loss     CARDIOVASCULAR SCREENING; LDL GOAL LESS THAN 160     Anemia     Thyroid nodule       Current Medications -   Current Outpatient Prescriptions:      clobetasol (TEMOVATE) 0.05 % cream, Apply sparingly to affected area twice weekly.    Do not apply to face. (Patient not taking: Reported on 10/15/2018), Disp: 15 g, Rfl: 1     doxycycline (VIBRAMYCIN) 100 MG capsule, Take 1 capsule (100 mg) by mouth 2 times daily for 14 days, Disp: 28 capsule, Rfl: 0     levothyroxine (SYNTHROID/LEVOTHROID) 125 MCG tablet, TAKE 1 TABLET BY MOUTH DAILY, Disp: 90 tablet, Rfl: 1     Multiple vitamin TABS, Take 1 tablet by mouth daily , Disp: , Rfl:      sulfamethoxazole-trimethoprim  "(BACTRIM DS/SEPTRA DS) 800-160 MG per tablet, Take 1 tablet by mouth 2 times daily, Disp: 14 tablet, Rfl: 0    Allergies -   Allergies   Allergen Reactions     Nka [No Known Allergies]        Social History -   Social History     Social History     Marital status:      Spouse name: N/A     Number of children: 4     Years of education: N/A     Social History Main Topics     Smoking status: Never Smoker     Smokeless tobacco: Never Used     Alcohol use No     Drug use: No     Sexual activity: Yes     Partners: Male     Birth control/ protection: Surgical      Comment: tubal     Other Topics Concern     Parent/Sibling W/ Cabg, Mi Or Angioplasty Before 65f 55m? No     Social History Narrative     Review of Systems - As per HPI and PMHx, otherwise 7 system review of the head and neck negative.    Physical Exam  /72  Pulse 77  Temp 97  F (36.1  C)  Resp 16  Ht 1.594 m (5' 2.75\")  Wt 63 kg (139 lb)  LMP 11/29/2015 (LMP Unknown)  SpO2 96%  BMI 24.82 kg/m2  General - The patient is in no distress.  Alert and oriented x3, answers questions and cooperates with examination appropriately.   Voice and Breathing - The patient was breathing comfortably without the use of accessory muscles. There was no wheezing, stridor, or stertor.  The patients voice was clear and strong.  Skin -skin of the nose and face is not erythematous.  No swelling.  No pain upon palpation.  Eyes - Extraocular movements intact. Sclera were not icteric or injected, conjunctiva were pink and moist.  Neurologic - Cranial nerves II-XII are grossly intact. Specifically, the facial nerve is intact, House-Brackmann grade 1 of 6.   Nose - No significant external deformity.  Nasal mucosa is pink and moist with no abnormal mucus.  The septum was midline, interestingly she appears to have bilateral middle turbinate polyps or polypoid changes.  Left side is a little bigger than the right.  The left polyp does not seem to significantly extend below " the head of the left middle turbinate.  Mouth - Examination of the oral cavity showed no lesions or ulcerations. The tongue was mobile and protrudes midline.   Oropharynx - The walls of the oropharynx were smooth, symmetric, and had no lesions or ulcerations. The uvula was midline and the palate raised symmetrically.   Neck -  Palpation of the occipital, submental, submandibular, internal jugular chain, and supraclavicular nodes did not demonstrate any abnormal lymph nodes or masses. The parotid glands were without masses. Palpation of the thyroid was soft and smooth, with no nodules or goiter appreciated.  The trachea was midline.      A/P - Kendra Montenegro is a 48 year old female with a facial cellulitis centered around the bridge of the nose.  This seems resolved but I recommend she finish her 14-day course of doxycycline as this had improved with other antibiotics but then came back.  I think it is unrelated to any sort of nasal disease.  However she has had worsening sense of smell over the last year.  I do see mild and small nasal polyposis around each middle turbinate.  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  We discussed chronic sinusitis and nasal polyps some.  I recommend nasal saline irrigations, Flonase, and treatment of any allergies.  Recheck the nose and polyps in 6 months.         Tonny Bhatia MD  Otolaryngology  [facility]      Again, thank you for allowing me to participate in the care of your patient.        Sincerely,        Tonny Bhatia MD

## 2018-10-30 NOTE — PATIENT INSTRUCTIONS
General Scheduling Information  To schedule your CT/MRI scan, please contact Eugenio Quintana at [phone]   [address] [place] [address]    To schedule your Surgery, please contact our Specialty Schedulers at [phone]    ENT Clinic Locations Clinic Hours Telephone Number     Annel Carias  [address] [place] [address]   Tuesday:       8:00am -- 4:00pm    Wednesday:  8:00am - 4:00pm   To schedule an appointment with   Dr. Bhatia,   please contact our   Specialty Scheduling Department at:     [phone]       Annel Zhao  [address] [place] [address]   Friday:          8:00am - 4:00pm         Urgent Care Locations Clinic Hours Telephone Numbers     Annel Julio  [address] [place] [address]     Monday-Friday:     11:00pm - 9:00pm    Saturday-Sunday:  9:00am - 5:00pm   [phone]     Annel Zhao  [address] [place] [address]     Monday-Friday:      5:00pm - 9:00pm     Saturday-Sunday:  9:00am - 5:00pm   [phone]

## 2018-10-30 NOTE — PROGRESS NOTES
Chief Complaint - skin infection    History of Present Illness - Kendra Montenegro is a 48 year old female presents with a nasal infection. The patient has noticed for approximately 1 month. It started sudden with nasal congestion, nasal swelling (bridge of nose). She started keflex, didn't help. Left face then swelled. She took bactrim, it helped, but didn't resolve. It was painful. Then took doxycycline. She is better. No pain. Swelling improved. The patient hasn't had anything like this before. Nonsmoker. No lumps or swollen glands in the neck. Has been on keflex, bactrim, and doxycycline. CT sinus (10/15/2018), images reviewed, showed no abscess or deeper tissue infection. 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 She has bilateral middle turbinate polyps, small. No nasal drainage. No cough. No trauma or bug bites.     Past Medical History -   Patient Active Problem List   Diagnosis     Thyromegaly     Hypothyroidism     Iron deficiency anemia due to chronic blood loss     CARDIOVASCULAR SCREENING; LDL GOAL LESS THAN 160     Anemia     Thyroid nodule       Current Medications -   Current Outpatient Prescriptions:      clobetasol (TEMOVATE) 0.05 % cream, Apply sparingly to affected area twice weekly.    Do not apply to face. (Patient not taking: Reported on 10/15/2018), Disp: 15 g, Rfl: 1     doxycycline (VIBRAMYCIN) 100 MG capsule, Take 1 capsule (100 mg) by mouth 2 times daily for 14 days, Disp: 28 capsule, Rfl: 0     levothyroxine (SYNTHROID/LEVOTHROID) 125 MCG tablet, TAKE 1 TABLET BY MOUTH DAILY, Disp: 90 tablet, Rfl: 1     Multiple vitamin TABS, Take 1 tablet by mouth daily , Disp: , Rfl:      sulfamethoxazole-trimethoprim (BACTRIM DS/SEPTRA DS) 800-160 MG per tablet, Take 1 tablet by mouth 2 times daily, Disp: 14 tablet, Rfl: 0    Allergies -   Allergies   Allergen Reactions     Nka [No Known Allergies]        Social History -   Social History     Social History      "Marital status:      Spouse name: N/A     Number of children: 4     Years of education: N/A     Social History Main Topics     Smoking status: Never Smoker     Smokeless tobacco: Never Used     Alcohol use No     Drug use: No     Sexual activity: Yes     Partners: Male     Birth control/ protection: Surgical      Comment: tubal     Other Topics Concern     Parent/Sibling W/ Cabg, Mi Or Angioplasty Before 65f 55m? No     Social History Narrative     Review of Systems - As per HPI and PMHx, otherwise 7 system review of the head and neck negative.    Physical Exam  /72  Pulse 77  Temp 97  F (36.1  C)  Resp 16  Ht 1.594 m (5' 2.75\")  Wt 63 kg (139 lb)  LMP 11/29/2015 (LMP Unknown)  SpO2 96%  BMI 24.82 kg/m2  General - The patient is in no distress.  Alert and oriented x3, answers questions and cooperates with examination appropriately.   Voice and Breathing - The patient was breathing comfortably without the use of accessory muscles. There was no wheezing, stridor, or stertor.  The patients voice was clear and strong.  Skin -skin of the nose and face is not erythematous.  No swelling.  No pain upon palpation.  Eyes - Extraocular movements intact. Sclera were not icteric or injected, conjunctiva were pink and moist.  Neurologic - Cranial nerves II-XII are grossly intact. Specifically, the facial nerve is intact, House-Brackmann grade 1 of 6.   Nose - No significant external deformity.  Nasal mucosa is pink and moist with no abnormal mucus.  The septum was midline, interestingly she appears to have bilateral middle turbinate polyps or polypoid changes.  Left side is a little bigger than the right.  The left polyp does not seem to significantly extend below the head of the left middle turbinate.  Mouth - Examination of the oral cavity showed no lesions or ulcerations. The tongue was mobile and protrudes midline.   Oropharynx - The walls of the oropharynx were smooth, symmetric, and had no lesions or " ulcerations. The uvula was midline and the palate raised symmetrically.   Neck -  Palpation of the occipital, submental, submandibular, internal jugular chain, and supraclavicular nodes did not demonstrate any abnormal lymph nodes or masses. The parotid glands were without masses. Palpation of the thyroid was soft and smooth, with no nodules or goiter appreciated.  The trachea was midline.      A/P - Kendra Montenegro is a 48 year old female with a facial cellulitis centered around the bridge of the nose.  This seems resolved but I recommend she finish her 14-day course of doxycycline as this had improved with other antibiotics but then came back.  I think it is unrelated to any sort of nasal disease.  However she has had worsening sense of smell over the last year.  I do see mild and small nasal polyposis around each middle turbinate.  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  We discussed chronic sinusitis and nasal polyps some.  I recommend nasal saline irrigations, Flonase, and treatment of any allergies.  Recheck the nose and polyps in 6 months.         Tonny Bhatia MD  Otolaryngology  [facility]

## 2019-01-14 ENCOUNTER — OFFICE VISIT (OUTPATIENT)
Dept: FAMILY MEDICINE | Facility: CLINIC | Age: 49
End: 2019-01-14
Payer: COMMERCIAL

## 2019-01-14 VITALS
HEIGHT: 63 IN | WEIGHT: 130.4 LBS | DIASTOLIC BLOOD PRESSURE: 78 MMHG | BODY MASS INDEX: 23.11 KG/M2 | HEART RATE: 92 BPM | TEMPERATURE: 97.9 F | SYSTOLIC BLOOD PRESSURE: 130 MMHG

## 2019-01-14 DIAGNOSIS — L03.211 CELLULITIS OF FACE: Primary | ICD-10-CM

## 2019-01-14 PROCEDURE — 99213 OFFICE O/P EST LOW 20 MIN: CPT | Performed by: FAMILY MEDICINE

## 2019-01-14 RX ORDER — DOXYCYCLINE 100 MG/1
100 CAPSULE ORAL 2 TIMES DAILY
Qty: 20 CAPSULE | Refills: 0 | Status: SHIPPED | OUTPATIENT
Start: 2019-01-14 | End: 2019-05-17

## 2019-01-14 ASSESSMENT — MIFFLIN-ST. JEOR: SCORE: 1190.62

## 2019-01-14 NOTE — PROGRESS NOTES
SUBJECTIVE:   Kendra Montenegro is a 48 year old female who presents to clinic today for the following health issues:    * nose, pain, swelling, redness for the last 2 days    No fevers or chills.  Felt like she might be coming down with something a few days ago but that resolved.  Does not feel ill.    Noted some mild erythema of the bridge of her nose 2 days ago.  Swelling and tenderness were more significant this AM and have been progressive throughout the day    Had similar initial presentation 10/2018 and eventually resolved with doxycycline.  Saw ENT who saw no underlying nasal disease    No trauma or injury to the site.  Pt does wear glasses      Problem list and histories reviewed & adjusted, as indicated.  Additional history: as documented      Reviewed and updated as needed this visit by clinical staff  Tobacco  Allergies  Meds  Med Hx  Surg Hx  Fam Hx  Soc Hx      Reviewed and updated as needed this visit by Provider  Tobacco  Meds  Med Hx  Surg Hx  Fam Hx  Soc Hx        ROS: Remainder of Constitutional, CV, Respiratory, GI,  negative with exception of that mentioned above    PE:  VS as above   Gen:  WN/WD/WH female in NAD   Face;  Erythema and swelling to the bridge of the nose.  Tender on palpation.  No fluctuance noted.      A/p:      ICD-10-CM    1. Cellulitis of face L03.211 doxycycline hyclate (VIBRAMYCIN) 100 MG capsule     unclear why she would have a recurrence in this area.  Will go back to doxycycline which was effective in the past.  Close follow up precautions given if worsening or not improving.

## 2019-01-14 NOTE — NURSING NOTE
"Initial /78   Pulse 92   Temp 97.9  F (36.6  C) (Tympanic)   Ht 1.6 m (5' 3\")   Wt 59.1 kg (130 lb 6.4 oz)   LMP 11/29/2015 (LMP Unknown)   Breastfeeding? No   BMI 23.10 kg/m   Estimated body mass index is 23.1 kg/m  as calculated from the following:    Height as of this encounter: 1.6 m (5' 3\").    Weight as of this encounter: 59.1 kg (130 lb 6.4 oz). .      "

## 2019-01-14 NOTE — PATIENT INSTRUCTIONS
We'll treat with the doxycycline again since it was effective last time.  Begin the antibiotic right away.    I would expect you to see no further worsening of the infection after 24 hours of antibiotic and some improvement by 48-72 hours.    If things get much worse (increased redness/pain/fever) please seek care in the emergency room.

## 2019-02-16 DIAGNOSIS — E06.3 HYPOTHYROIDISM DUE TO HASHIMOTO'S THYROIDITIS: ICD-10-CM

## 2019-02-18 RX ORDER — LEVOTHYROXINE SODIUM 125 UG/1
TABLET ORAL
Qty: 90 TABLET | Refills: 0 | Status: SHIPPED | OUTPATIENT
Start: 2019-02-18 | End: 2019-07-15

## 2019-02-18 NOTE — TELEPHONE ENCOUNTER
"Requested Prescriptions   Pending Prescriptions Disp Refills     levothyroxine (SYNTHROID/LEVOTHROID) 125 MCG tablet [Pharmacy Med Name: LEVOTHYROXINE 0.125MG (125MCG) TAB] 90 tablet 0    Last Written Prescription Date:  06/12/2018 #90 x 1  Last filled 01/17/2019  Last office visit: 1/14/2019 CHARLES Suazo   Future Office Visit: None    Sig: TAKE 1 TABLET BY MOUTH DAILY    Thyroid Protocol Passed - 2/16/2019  6:55 AM       Passed - Patient is 12 years or older       Passed - Recent (12 mo) or future (30 days) visit within the authorizing provider's specialty    Patient had office visit in the last 12 months or has a visit in the next 30 days with authorizing provider or within the authorizing provider's specialty.  See \"Patient Info\" tab in inbasket, or \"Choose Columns\" in Meds & Orders section of the refill encounter.             Passed - Medication is active on med list       Passed - Normal TSH on file in past 12 months    Recent Labs   Lab Test 05/30/18  0755   TSH 1.76             Passed - No active pregnancy on record    If patient is pregnant or has had a positive pregnancy test, please check TSH.         Passed - No positive pregnancy test in past 12 months    If patient is pregnant or has had a positive pregnancy test, please check TSH.            "

## 2019-05-16 ENCOUNTER — HOSPITAL ENCOUNTER (EMERGENCY)
Facility: CLINIC | Age: 49
Discharge: HOME | End: 2019-05-17
Attending: EMERGENCY MEDICINE | Admitting: EMERGENCY MEDICINE
Payer: COMMERCIAL

## 2019-05-16 DIAGNOSIS — R10.84 GENERALIZED ABDOMINAL PAIN: ICD-10-CM

## 2019-05-16 DIAGNOSIS — R10.13 ABDOMINAL PAIN, EPIGASTRIC: ICD-10-CM

## 2019-05-16 LAB
BASOPHILS # BLD AUTO: 0 10E9/L (ref 0–0.2)
BASOPHILS NFR BLD AUTO: 0.4 %
DIFFERENTIAL METHOD BLD: NORMAL
EOSINOPHIL # BLD AUTO: 0.1 10E9/L (ref 0–0.7)
EOSINOPHIL NFR BLD AUTO: 2.6 %
ERYTHROCYTE [DISTWIDTH] IN BLOOD BY AUTOMATED COUNT: 12.4 % (ref 10–15)
HCT VFR BLD AUTO: 42.1 % (ref 35–47)
HGB BLD-MCNC: 13.9 G/DL (ref 11.7–15.7)
IMM GRANULOCYTES # BLD: 0 10E9/L (ref 0–0.4)
IMM GRANULOCYTES NFR BLD: 0.2 %
LYMPHOCYTES # BLD AUTO: 1.8 10E9/L (ref 0.8–5.3)
LYMPHOCYTES NFR BLD AUTO: 35.6 %
MCH RBC QN AUTO: 28.2 PG (ref 26.5–33)
MCHC RBC AUTO-ENTMCNC: 33 G/DL (ref 31.5–36.5)
MCV RBC AUTO: 85 FL (ref 78–100)
MONOCYTES # BLD AUTO: 0.5 10E9/L (ref 0–1.3)
MONOCYTES NFR BLD AUTO: 9.1 %
NEUTROPHILS # BLD AUTO: 2.6 10E9/L (ref 1.6–8.3)
NEUTROPHILS NFR BLD AUTO: 52.1 %
NRBC # BLD AUTO: 0 10*3/UL
NRBC BLD AUTO-RTO: 0 /100
PLATELET # BLD AUTO: 227 10E9/L (ref 150–450)
RBC # BLD AUTO: 4.93 10E12/L (ref 3.8–5.2)
WBC # BLD AUTO: 4.9 10E9/L (ref 4–11)

## 2019-05-16 PROCEDURE — 83690 ASSAY OF LIPASE: CPT | Performed by: EMERGENCY MEDICINE

## 2019-05-16 PROCEDURE — 85025 COMPLETE CBC W/AUTO DIFF WBC: CPT | Performed by: EMERGENCY MEDICINE

## 2019-05-16 PROCEDURE — 99285 EMERGENCY DEPT VISIT HI MDM: CPT | Mod: 25 | Performed by: EMERGENCY MEDICINE

## 2019-05-16 PROCEDURE — 99284 EMERGENCY DEPT VISIT MOD MDM: CPT | Mod: Z6 | Performed by: EMERGENCY MEDICINE

## 2019-05-16 PROCEDURE — 80053 COMPREHEN METABOLIC PANEL: CPT | Performed by: EMERGENCY MEDICINE

## 2019-05-16 ASSESSMENT — MIFFLIN-ST. JEOR: SCORE: 1194.47

## 2019-05-16 NOTE — ED AVS SNAPSHOT
[facility]  [address] [address]  Phone:  [phone]  Fax:  [phone]                                    Kendra Montenegro   MRN: [id number]    Department:  [facility]   Date of Visit:  5/16/2019           After Visit Summary Signature Page    I have received my discharge instructions, and my questions have been answered. I have discussed any challenges I see with this plan with the nurse or doctor.    ..........................................................................................................................................  Patient/Patient Representative Signature      ..........................................................................................................................................  Patient Representative Print Name and Relationship to Patient    ..................................................               ................................................  Date                                   Time    ..........................................................................................................................................  Reviewed by Signature/Title    ...................................................              ..............................................  Date                                               Time          22EPIC Rev 08/18

## 2019-05-17 ENCOUNTER — OFFICE VISIT (OUTPATIENT)
Dept: FAMILY MEDICINE | Facility: CLINIC | Age: 49
End: 2019-05-17
Payer: COMMERCIAL

## 2019-05-17 ENCOUNTER — APPOINTMENT (OUTPATIENT)
Dept: CT IMAGING | Facility: CLINIC | Age: 49
End: 2019-05-17
Attending: EMERGENCY MEDICINE
Payer: COMMERCIAL

## 2019-05-17 ENCOUNTER — NURSE TRIAGE (OUTPATIENT)
Dept: NURSING | Facility: CLINIC | Age: 49
End: 2019-05-17

## 2019-05-17 VITALS
TEMPERATURE: 97.2 F | DIASTOLIC BLOOD PRESSURE: 86 MMHG | WEIGHT: 132 LBS | HEART RATE: 76 BPM | BODY MASS INDEX: 23.76 KG/M2 | SYSTOLIC BLOOD PRESSURE: 138 MMHG

## 2019-05-17 VITALS
SYSTOLIC BLOOD PRESSURE: 141 MMHG | WEIGHT: 133 LBS | OXYGEN SATURATION: 100 % | TEMPERATURE: 97.9 F | HEIGHT: 63 IN | BODY MASS INDEX: 23.57 KG/M2 | HEART RATE: 69 BPM | RESPIRATION RATE: 18 BRPM | DIASTOLIC BLOOD PRESSURE: 93 MMHG

## 2019-05-17 DIAGNOSIS — L90.0 LICHEN SCLEROSUS: ICD-10-CM

## 2019-05-17 DIAGNOSIS — N94.10 DYSPAREUNIA IN FEMALE: ICD-10-CM

## 2019-05-17 DIAGNOSIS — R10.13 EPIGASTRIC PAIN: Primary | ICD-10-CM

## 2019-05-17 LAB
ALBUMIN SERPL-MCNC: 4.8 G/DL (ref 3.4–5)
ALP SERPL-CCNC: 52 U/L (ref 40–150)
ALT SERPL W P-5'-P-CCNC: 17 U/L (ref 0–50)
ANION GAP SERPL CALCULATED.3IONS-SCNC: 4 MMOL/L (ref 3–14)
AST SERPL W P-5'-P-CCNC: 16 U/L (ref 0–45)
BILIRUB SERPL-MCNC: 0.6 MG/DL (ref 0.2–1.3)
BUN SERPL-MCNC: 17 MG/DL (ref 7–30)
CALCIUM SERPL-MCNC: 9.3 MG/DL (ref 8.5–10.1)
CHLORIDE SERPL-SCNC: 103 MMOL/L (ref 94–109)
CO2 SERPL-SCNC: 30 MMOL/L (ref 20–32)
CREAT SERPL-MCNC: 0.89 MG/DL (ref 0.52–1.04)
GFR SERPL CREATININE-BSD FRML MDRD: 76 ML/MIN/{1.73_M2}
GLUCOSE SERPL-MCNC: 92 MG/DL (ref 70–99)
LIPASE SERPL-CCNC: 148 U/L (ref 73–393)
POTASSIUM SERPL-SCNC: 3.8 MMOL/L (ref 3.4–5.3)
PROT SERPL-MCNC: 8.3 G/DL (ref 6.8–8.8)
SODIUM SERPL-SCNC: 137 MMOL/L (ref 133–144)

## 2019-05-17 PROCEDURE — 25000128 H RX IP 250 OP 636: Performed by: EMERGENCY MEDICINE

## 2019-05-17 PROCEDURE — 74177 CT ABD & PELVIS W/CONTRAST: CPT

## 2019-05-17 PROCEDURE — 99214 OFFICE O/P EST MOD 30 MIN: CPT | Performed by: FAMILY MEDICINE

## 2019-05-17 PROCEDURE — 25000125 ZZHC RX 250: Performed by: EMERGENCY MEDICINE

## 2019-05-17 RX ORDER — CLOBETASOL PROPIONATE 0.5 MG/G
CREAM TOPICAL
Qty: 15 G | Refills: 1 | Status: SHIPPED | OUTPATIENT
Start: 2019-05-17 | End: 2020-10-13

## 2019-05-17 RX ORDER — IOPAMIDOL 755 MG/ML
65 INJECTION, SOLUTION INTRAVASCULAR ONCE
Status: COMPLETED | OUTPATIENT
Start: 2019-05-17 | End: 2019-05-17

## 2019-05-17 RX ADMIN — SODIUM CHLORIDE 56 ML: 9 INJECTION, SOLUTION INTRAVENOUS at 00:32

## 2019-05-17 RX ADMIN — IOPAMIDOL 65 ML: 755 INJECTION, SOLUTION INTRAVENOUS at 00:33

## 2019-05-17 ASSESSMENT — ENCOUNTER SYMPTOMS
ABDOMINAL PAIN: 1
SHORTNESS OF BREATH: 0
FEVER: 0

## 2019-05-17 ASSESSMENT — PAIN SCALES - GENERAL: PAINLEVEL: SEVERE PAIN (7)

## 2019-05-17 NOTE — PROGRESS NOTES
"  SUBJECTIVE:   Kendra Montenegro is a 48 year old female who presents to clinic today for the following   health issues:      ED/UC Followup:    Facility:  [facility] ED  Date of visit: 4/16/19  Reason for visit: abdominal pain   Current Status: pain is worse     Pain has \"for sure\" been going on since Saturday but perhaps before that as well    Pain is in the upper abdomen and radiates around to the sides.    Feels like a continual \"ache\".  Feels like she just wants to \"curl up in a ball\"  In general not nauseated but has been briefly this afternoon.  Appetite is decreased.  No vomiting.  No constipation or diarrhea.    Unsure how eating impacts her symptoms. Perhaps sometimes a bit worse but yesterday seemed better after eating    No fevers.    Has been taking excedrin for pain just twice.   Had been told to start prilosec but did not, wasn't sure it was the right thing to do.    ED eval and labs reviewed with pt.    Also c/o pain with intercourse for a few months.  Describes this as a stabbing pain.  This has been new since her hyst.  Wonders if this is related to her current pain or if her current pain might be due to endometriosis.    Notes when she was in the ED to leave a urine specimen she noted \"some pink\" on the wipe used for cleaning.  Has not noted any bleeding either vaginally or in the urine but wonders where this might have come from.    Has a h/o lichen sclerosus but has not been using her cream for months.  Not symptomatic currently    Additional history: as documented    Reviewed  and updated as needed this visit by clinical staff  Tobacco  Allergies  Meds  Med Hx  Surg Hx  Fam Hx  Soc Hx        Reviewed and updated as needed this visit by Provider  Tobacco  Meds  Med Hx  Surg Hx  Fam Hx  Soc Hx        ROS: Remainder of Constitutional, CV, Respiratory, GI,  negative with exception of that mentioned above    PE:  VS as above   Gen:  WN/WD/WH female in NAD   Neck:  supple, no LAD " appreciated   Heart:  RRR without murmur, nl S1, S2, no rubs or gallops   Lungs CTA alexis without rales/ronchi/wheezes   Abd: soft, postive bowel sounds, ild tenderness superior to umbilicus, ND, no HSM, no rebounding/guarding/ridigity   :  Lichen sclerosus noted with loss of labia minora alexis and fusion of clitoral lamb.  No ulcers, fissures or areas of abnormal texture.  No evidence of recent bleeding.  Vaginal atrophy noted.  Bimanual exam with no adnexal tenderness or tenderness   Ext:  No pedal edema    A/P:      ICD-10-CM    1. Epigastric pain R10.13 GASTROENTEROLOGY ADULT REF PROCEDURE ONLY [facility] [phone]; [place] [facility]   2. Dyspareunia in female N94.10 OB/GYN REFERRAL   3. Lichen sclerosus L90.0 clobetasol (TEMOVATE) 0.05 % external cream     Patient Instructions   I would recommend you begin ranitidine (Zantac) 150mg orally twice daily.  Take your first dose this evening.  Please also begin the omeprazole 20mg once daily,  This is taken in the morning on an empty stomach.   After 4 days or so you can stop the ranitidine and continue the omeprazole.    Please also call and schedule the endoscopy for next week.    I placed a new referral for GYN to address the pain with intercourse.  I also refilled the clobetasol, please restart that.    If you feel your symptoms change or worsen please do not hesitate to seek additional emergent care.        Suspect gastritis or PUD as most likely etiology for pain.  Plan as above.

## 2019-05-17 NOTE — NURSING NOTE
"Initial /86   Pulse 76   Temp 97.2  F (36.2  C) (Tympanic)   Wt 59.9 kg (132 lb)   LMP 11/29/2015 (LMP Unknown)   Breastfeeding? No   BMI 23.76 kg/m   Estimated body mass index is 23.76 kg/m  as calculated from the following:    Height as of 5/16/19: 1.588 m (5' 2.5\").    Weight as of this encounter: 59.9 kg (132 lb). .      "

## 2019-05-17 NOTE — ED PROVIDER NOTES
History     Chief Complaint   Patient presents with     Abdominal Pain     HPI  Kendra Montenegro is a 48 year old female who has past medical history significant for hypothyroidism, presenting to the emergency department with concerns regarding epigastric abdominal discomfort.  Patient states that the symptoms have worsened over the past number of days, approximately 4 to 5 days, however later does state in addition to  that the symptoms have actually been present over the past 2 weeks.  Crampy abdominal pain, diffuse in nature, however primarily right-sided has been noted over the past couple weeks.  No vomiting.  Denies any nausea.  No urinary symptoms, or change in bowel patterns.  Reported history one time previously of diverticulitis.  Has had prior hysterectomy.  No other abdominal procedures.  No fever.  Has not had colonoscopy/endoscopy    Allergies:  Allergies   Allergen Reactions     Nka [No Known Allergies]        Problem List:    Patient Active Problem List    Diagnosis Date Noted     Thyroid nodule 04/21/2017     Priority: Medium     Anemia 12/04/2015     Priority: Medium     CARDIOVASCULAR SCREENING; LDL GOAL LESS THAN 160 10/05/2015     Priority: Medium     Iron deficiency anemia due to chronic blood loss 09/17/2015     Priority: Medium     Thyromegaly 12/04/2014     Priority: Medium     Hypothyroidism 12/04/2014     Priority: Medium        Past Medical History:    Past Medical History:   Diagnosis Date     Hx of abnormal Pap smear ??       Past Surgical History:    Past Surgical History:   Procedure Laterality Date     CYSTOSCOPY N/A 1/11/2016    Procedure: CYSTOSCOPY;  Surgeon: Juan Jose Burgess MD;  Location: [facility] OR     DILATION AND CURETTAGE, HYSTEROSCOPY DIAGNOSTIC, COMBINED N/A 10/2/2015    Procedure: COMBINED DILATION AND CURETTAGE, HYSTEROSCOPY DIAGNOSTIC;  Surgeon: Juan Jose Burgess MD;  Location: [facility] OR     HYSTERECTOMY, PAP NO LONGER INDICATED  1/11/2016     LAPAROSCOPIC  "HYSTERECTOMY TOTAL, BILATERAL SALPINGO-OOPHORECTOMY, COMBINED N/A 1/11/2016    Procedure: COMBINED LAPAROSCOPIC HYSTERECTOMY TOTAL, SALPINGO-OOPHORECTOMY;  Surgeon: Juan Jose Burgess MD;  Location: [facility] OR     SURGICAL HISTORY OF -   1999    removal of endometriosis,L ovary and a fallopian tube     TUBAL LIGATION         Family History:    No family history on file.    Social History:  Marital Status:   [2]  Social History     Tobacco Use     Smoking status: Never Smoker     Smokeless tobacco: Never Used   Substance Use Topics     Alcohol use: No     Drug use: No        Medications:      clobetasol (TEMOVATE) 0.05 % cream   levothyroxine (SYNTHROID/LEVOTHROID) 125 MCG tablet   Multiple vitamin TABS         Review of Systems   Constitutional: Negative for fever.   Respiratory: Negative for shortness of breath.    Cardiovascular: Negative for chest pain.   Gastrointestinal: Positive for abdominal pain.   All other systems reviewed and are negative.      Physical Exam   BP: (!) 168/116  Pulse: 76  Heart Rate: 70  Temp: 97.9  F (36.6  C)  Resp: 18  Height: 158.8 cm (5' 2.5\")  Weight: 60.3 kg (133 lb)  SpO2: 94 %      Physical Exam  BP (!) 141/93   Pulse 69   Temp 97.9  F (36.6  C) (Oral)   Resp 18   Ht 1.588 m (5' 2.5\")   Wt 60.3 kg (133 lb)   LMP 11/29/2015 (LMP Unknown)   SpO2 100%   BMI 23.94 kg/m    General: alert, interactive, in no apparent distress  Head: atraumatic  Nose: no rhinorrhea or epistaxis  Ears: no external auditory canal discharge or bleeding.    Eyes: Sclera nonicteric. Conjunctiva noninjected. PERRL, EOMI  Mouth: no tonsillar erythema, edema, or exudate  Neck: supple, no palp LAD  Lungs: CTAB  CV: RRR, S1/S2; peripheral pulses palpable and symmetric  Abdomen: soft, mild RLQ and RUQ tenderness, nd, no guarding or rebound. Positive bowel sounds  Extremities: no cyanosis or edema  Skin: no rash or diaphoresis  Neuro:  strength 5/5 in UE and LEs bilaterally, sensation intact to light " touch in UE and LEs bilaterally;       ED Course        Procedures               Critical Care time:  none               Results for orders placed or performed during the hospital encounter of 05/16/19 (from the past 24 hour(s))   CBC with platelets differential   Result Value Ref Range    WBC 4.9 4.0 - 11.0 10e9/L    RBC Count 4.93 3.8 - 5.2 10e12/L    Hemoglobin 13.9 11.7 - 15.7 g/dL    Hematocrit 42.1 35.0 - 47.0 %    MCV 85 78 - 100 fl    MCH 28.2 26.5 - 33.0 pg    MCHC 33.0 31.5 - 36.5 g/dL    RDW 12.4 10.0 - 15.0 %    Platelet Count 227 150 - 450 10e9/L    Diff Method Automated Method     % Neutrophils 52.1 %    % Lymphocytes 35.6 %    % Monocytes 9.1 %    % Eosinophils 2.6 %    % Basophils 0.4 %    % Immature Granulocytes 0.2 %    Nucleated RBCs 0 0 /100    Absolute Neutrophil 2.6 1.6 - 8.3 10e9/L    Absolute Lymphocytes 1.8 0.8 - 5.3 10e9/L    Absolute Monocytes 0.5 0.0 - 1.3 10e9/L    Absolute Eosinophils 0.1 0.0 - 0.7 10e9/L    Absolute Basophils 0.0 0.0 - 0.2 10e9/L    Abs Immature Granulocytes 0.0 0 - 0.4 10e9/L    Absolute Nucleated RBC 0.0    Comprehensive metabolic panel   Result Value Ref Range    Sodium 137 133 - 144 mmol/L    Potassium 3.8 3.4 - 5.3 mmol/L    Chloride 103 94 - 109 mmol/L    Carbon Dioxide 30 20 - 32 mmol/L    Anion Gap 4 3 - 14 mmol/L    Glucose 92 70 - 99 mg/dL    Urea Nitrogen 17 7 - 30 mg/dL    Creatinine 0.89 0.52 - 1.04 mg/dL    GFR Estimate 76 >60 mL/min/[1.73_m2]    GFR Estimate If Black 88 >60 mL/min/[1.73_m2]    Calcium 9.3 8.5 - 10.1 mg/dL    Bilirubin Total 0.6 0.2 - 1.3 mg/dL    Albumin 4.8 3.4 - 5.0 g/dL    Protein Total 8.3 6.8 - 8.8 g/dL    Alkaline Phosphatase 52 40 - 150 U/L    ALT 17 0 - 50 U/L    AST 16 0 - 45 U/L   Lipase   Result Value Ref Range    Lipase 148 73 - 393 U/L   CT Abdomen Pelvis w Contrast    Narrative    CT ABDOMEN PELVIS W CONTRAST   5/17/2019 12:42 AM     HISTORY: Abdominal pain, gastroenteritis or colitis suspected. Right  lower quadrant and  right upper quadrant abdominal pains.    TECHNIQUE: CT abdomen and pelvis with 65 mL Isovue-370 IV. Radiation  dose for this scan was reduced using automated exposure control,  adjustment of the mA and/or kV according to patient size, or iterative  reconstruction technique.    COMPARISON: None.    FINDINGS:  Abdomen: The lung bases are unremarkable. The liver, spleen,  gallbladder, pancreas, adrenal glands and kidneys are normal in  appearance. There is no abdominal or pelvic lymph node enlargement.    Pelvis: There are colonic diverticula without acute diverticulitis. No  bowel obstruction or inflammation. The appendix is seen in part and is  within normal limits. There is no free intraperitoneal gas or fluid.  No adnexal mass.      Impression    IMPRESSION:  1. No acute abnormality. No bowel obstruction or inflammation.  2. Colonic diverticula without acute diverticulitis.       Medications   iopamidol (ISOVUE-370) solution 65 mL (65 mLs Intravenous Given 5/17/19 0033)   sodium chloride 0.9 % bag 500mL for CT scan flush use (56 mLs Intravenous Given 5/17/19 0032)       Assessments & Plan (with Medical Decision Making)  48 year old female, presenting to the emergency department with right-sided abdominal pain, which is epigastric, and right lower quadrant.  Symptoms present over the past couple weeks.  She arrives afebrile, normal vitals.  Has had prior hysterectomy.  No recent surgical procedures.  Concern is for diverticulitis, appendicitis,, hepatitis, cholecystitis or biliary colic.    IV established, and labs drawn.  CBC, CMP, and lipase are all normal.  CT scan of the abdomen/pelvis is performed that shows no acute abnormality.  Has diverticulosis which is noted.  Given her symptoms, concern for possible ulcer remains on differential, and therefore patient has been encouraged trial at omeprazole therapy.  No history of upper endoscopy or colonoscopy procedure, and patient is encouraged to consider this in the  future.  Does not have specific worsening of pains with food intake, and therefore not convinced that this represents biliary etiology.    Patient will be discharged home, follow-up with primary care provider as needed.     I have reviewed the nursing notes.    I have reviewed the findings, diagnosis, plan and need for follow up with the patient.          Medication List      There are no discharge medications for this visit.         Final diagnoses:   Generalized abdominal pain   Abdominal pain, epigastric       5/16/2019   [facility] EMERGENCY DEPARTMENT     Benjamin Pascual MD  05/17/19 0206

## 2019-05-18 NOTE — TELEPHONE ENCOUNTER
Seen last night at [facility]  for abdominal pain x4d.  provider rec ED eval due to nature of pain, hematuria, petechial rash and bruising. At ED abdominal CT was WNL. Discharged home w/ Prilosec. Saw PCP in f/u today and was advised to also take ranitidine. Pt says tonight her abdominal pain is worse. Rates severity of pain 10 out of 10. No vomiting. No fever.  Also c/o lower back pain. Advised r/t ED since pain is worse and very severe. Pt voiced understanding and agreement. Tana Mclaughlin RN/FNA    .     Reason for Disposition    SEVERE pain (e.g., excruciating, pain scale 8-10) AND [2] not improved after pain medications    Additional Information    Negative: Shock suspected (e.g., cold/pale/clammy skin, too weak to stand, low BP, rapid pulse)    Negative: Difficult to awaken or acting confused (e.g., disoriented, slurred speech)    Negative: Sounds like a life-threatening emergency to the triager    Negative: Recent (within last 24 hours) medical visit for an injury    Negative: Recent surgery or surgical procedure    Negative: Recent discharge from the hospital    Negative: Asthma attack diagnosed recently    Negative: Flu (influenza) diagnosed recently    Negative: Ear infection (otitis media, middle ear infection) diagnosed recently    Negative: Ear infection (otitis externa, swimmer's ear) diagnosed recently    Negative: [1] Sinus infection AND [2] taking an antibiotic    Negative: Skin infection (cellulitis) diagnosed recently    Negative: Strep throat (strep pharyngitis) diagnosed recently    Negative: Threatened miscarriage (threatened ) recently diagnosed    Negative: Urine infection (FEMALE; cystitis, pyelonephritis, urethritis) ) diagnosed recently    Negative: Urine infection (MALE; cystitis, pyelonephritis, prostatitis, epidydimitis, orchitis, urethritis) diagnosed recently    Negative: Taking antibiotic for other infection    Negative: More than 24 hours since medical visit     Negative: [1] Drinking very little AND [2] dehydration suspected (e.g., no urine > 12 hours, very dry mouth, very lightheaded)    Negative: Patient sounds very sick or weak to the triager    Negative: Fever > 104 F (40 C)    Protocols used: RECENT MEDICAL VISIT FOR ILLNESS FOLLOW-UP CALL-A-AH

## 2019-05-20 ENCOUNTER — TELEPHONE (OUTPATIENT)
Dept: FAMILY MEDICINE | Facility: CLINIC | Age: 49
End: 2019-05-20

## 2019-05-20 NOTE — TELEPHONE ENCOUNTER
Pt is calling and states that she was seen in the ER on 5/16 for abdominal pain and  Had a follow up with dillon for it and has been taking zantax and Prilosec  For it and it is causing her BM to be irregular and she is still having stomach pain, she is asking to talk to a nurse, please triage.     Sylvia Puri, Station

## 2019-05-20 NOTE — TELEPHONE ENCOUNTER
Pt was transferred through to the doctor line at 6:50.  I answered the call.    She was returning call form this AM.  Has been having neck pain and headache since Saturday.      Was seen in clinic on Friday.  Was started on omeprazole and ranitidine for epigastric pain.  States after the ranitidine dose her abdominal pain became much more severe.  She called triage and was directed to ED but did not go.  Discussed with a neighbor who is also a physician.    Now has had neck pain in the back of the neck radiating up to the head causing HA since Saturday.  Feels abd pain is perhaps a bit better.    States she was taking tylenol Saturday and Sunday without relief so did not take any today.  Has used Biofreeze some today.  Was seen at the chiropractor this AM without relief.  Has been at work all day.    Wants to know if neck pain and headache are side effects of the omeprazole or ranitidine.    Reviewed with pt that I would not expect those to be side effects of the medications.  Offered appointment for tomorrow at 2:40 same day, pt declined.  She states pain has been worsening since it began on Saturday.    Advised eval tonight in ED if pain is severe and worsening.  Pt unsure if she will go in.    Called pt back shortly after having up after reviewing adverse reactions of both medications.  HA is listed for both  She is going to hold both medicines and see how things go.  Rosario Suazo

## 2019-05-21 NOTE — TELEPHONE ENCOUNTER
Call placed to patient. Voicemail message left , checking on symptoms today. Call back number given for clinic RN.  Clotilde Zimmerman RN

## 2019-05-22 NOTE — TELEPHONE ENCOUNTER
"I spoke to Kendra. She says, \"The pain is about the same. I didn't sleep much last night. My head feels like it weighs 20 pounds and I'd say the pain is like an 8-9 on the pain scale.\"    Patient did not take any pain relievers since last night, \"because she doesn't like to treat with meds.\" Pain starts on the right side of her neck and head and radiates to top of head and down right arm. She is not sure if this is related to some medication she took for stomach acid or something entirely new. When I asked her what pharmacy she used in the event you wanted to try a medication she said . \"I would rather find out the cause than to treat with a medicine.\" I let the patient know her recent lab work was normal.    Patient also mentioned she was binding books with her daughter Last Thursday and struck her hand which caused a pretty bad bruise. Wondering if this might have done something? When questioned she said that binding books did not overuse her right side and is not a difficult thing to do. She has tried ibuprofen and tylenol, ice and heat.    Patients  questioning whether a tick could causes symptoms although the patient does not recall a recent bite. I let them know I would forward her concerns. Carmita Lilly RN    "

## 2019-05-22 NOTE — TELEPHONE ENCOUNTER
She really needs to be seen.  I had offered her an appointment Tuesday when I spoke with her Monday which she declined.    At this point the only option I have is 2:40 Friday.  Otherwise she will have to see one of my partners.    Rosario Suazo

## 2019-05-23 ENCOUNTER — ANESTHESIA EVENT (OUTPATIENT)
Dept: GASTROENTEROLOGY | Facility: CLINIC | Age: 49
End: 2019-05-23
Payer: COMMERCIAL

## 2019-05-23 NOTE — ANESTHESIA PREPROCEDURE EVALUATION
Anesthesia Pre-Procedure Evaluation    Patient: Kendra Montenegro   MRN: [id number] : 1970          Preoperative Diagnosis: epigastric pain  diagnostic    Procedure(s):  ESOPHAGOGASTRODUODENOSCOPY (EGD)    Past Medical History:   Diagnosis Date     Hx of abnormal Pap smear ??    possible CRYO in the past?     Past Surgical History:   Procedure Laterality Date     CYSTOSCOPY N/A 2016    Procedure: CYSTOSCOPY;  Surgeon: Juan Jose Burgess MD;  Location: [facility] OR     DILATION AND CURETTAGE, HYSTEROSCOPY DIAGNOSTIC, COMBINED N/A 10/2/2015    Procedure: COMBINED DILATION AND CURETTAGE, HYSTEROSCOPY DIAGNOSTIC;  Surgeon: Juan Jose Burgess MD;  Location: [facility] OR     HYSTERECTOMY, PAP NO LONGER INDICATED  2016     LAPAROSCOPIC HYSTERECTOMY TOTAL, BILATERAL SALPINGO-OOPHORECTOMY, COMBINED N/A 2016    Procedure: COMBINED LAPAROSCOPIC HYSTERECTOMY TOTAL, SALPINGO-OOPHORECTOMY;  Surgeon: Juan Jose Burgess MD;  Location: [facility] OR     SURGICAL HISTORY OF -       removal of endometriosis,L ovary and a fallopian tube     TUBAL LIGATION         Anesthesia Evaluation     . Pt has had prior anesthetic. Type: General    No history of anesthetic complications          ROS/MED HX    ENT/Pulmonary:  - neg pulmonary ROS     Neurologic:  - neg neurologic ROS     Cardiovascular:  - neg cardiovascular ROS       METS/Exercise Tolerance:  >4 METS   Hematologic:     (+) Anemia, -      Musculoskeletal:  - neg musculoskeletal ROS       GI/Hepatic:  - neg GI/hepatic ROS       Renal/Genitourinary:  - ROS Renal section negative       Endo:     (+) thyroid problem hypothyroidism, .      Psychiatric:  - neg psychiatric ROS       Infectious Disease:  - neg infectious disease ROS       Malignancy:      - no malignancy   Other:    - neg other ROS                      Physical Exam  Normal systems: cardiovascular, pulmonary and dental    Airway   Mallampati: I  TM distance: >3 FB  Neck ROM: full    Dental     Cardiovascular  "      Pulmonary             Lab Results   Component Value Date    WBC 4.9 05/16/2019    HGB 13.9 05/16/2019    HCT 42.1 05/16/2019     05/16/2019     05/16/2019    POTASSIUM 3.8 05/16/2019    CHLORIDE 103 05/16/2019    CO2 30 05/16/2019    BUN 17 05/16/2019    CR 0.89 05/16/2019    GLC 92 05/16/2019    COLBY 9.3 05/16/2019    ALBUMIN 4.8 05/16/2019    PROTTOTAL 8.3 05/16/2019    ALT 17 05/16/2019    AST 16 05/16/2019    ALKPHOS 52 05/16/2019    BILITOTAL 0.6 05/16/2019    LIPASE 148 05/16/2019    INR 1.05 12/04/2015    TSH 1.76 05/30/2018    T4 1.11 08/26/2015    HCG Negative 01/11/2016       Preop Vitals  BP Readings from Last 3 Encounters:   05/17/19 138/86   05/17/19 (!) 141/93   01/14/19 130/78    Pulse Readings from Last 3 Encounters:   05/17/19 76   05/17/19 69   01/14/19 92      Resp Readings from Last 3 Encounters:   05/16/19 18   10/30/18 16   10/23/18 (!) 98    SpO2 Readings from Last 3 Encounters:   05/16/19 100%   10/30/18 96%   10/23/18 97%      Temp Readings from Last 1 Encounters:   05/17/19 36.2  C (97.2  F) (Tympanic)    Ht Readings from Last 1 Encounters:   05/16/19 1.588 m (5' 2.5\")      Wt Readings from Last 1 Encounters:   05/17/19 59.9 kg (132 lb)    Estimated body mass index is 23.76 kg/m  as calculated from the following:    Height as of 5/16/19: 1.588 m (5' 2.5\").    Weight as of 5/17/19: 59.9 kg (132 lb).       Anesthesia Plan      History & Physical Review      ASA Status:  2 .    NPO Status:  > 8 hours    Plan for MAC Reason for MAC:  Deep or markedly invasive procedure (G8)         Postoperative Care      Consents  Anesthetic plan, risks, benefits and alternatives discussed with:  Patient..                 Babar Hopkins CRNA, APRN CRNA  "

## 2019-05-23 NOTE — TELEPHONE ENCOUNTER
I called patients home and spouse said that she is at school all day and cannot be reached. I asked about a cell phone and he said she cannot be interrupted.    Asked that she call back tomorrow morning. Carmita Lilly RN

## 2019-05-28 ENCOUNTER — ANESTHESIA (OUTPATIENT)
Dept: GASTROENTEROLOGY | Facility: CLINIC | Age: 49
End: 2019-05-28
Payer: COMMERCIAL

## 2019-05-28 ENCOUNTER — HOSPITAL ENCOUNTER (OUTPATIENT)
Facility: CLINIC | Age: 49
Discharge: HOME | End: 2019-05-28
Attending: SURGERY | Admitting: SURGERY
Payer: COMMERCIAL

## 2019-05-28 VITALS
BODY MASS INDEX: 23.39 KG/M2 | HEART RATE: 86 BPM | TEMPERATURE: 97.9 F | RESPIRATION RATE: 16 BRPM | SYSTOLIC BLOOD PRESSURE: 126 MMHG | OXYGEN SATURATION: 98 % | WEIGHT: 132 LBS | DIASTOLIC BLOOD PRESSURE: 98 MMHG | HEIGHT: 63 IN

## 2019-05-28 LAB — UPPER GI ENDOSCOPY: NORMAL

## 2019-05-28 PROCEDURE — 43239 EGD BIOPSY SINGLE/MULTIPLE: CPT | Performed by: SURGERY

## 2019-05-28 PROCEDURE — 25000128 H RX IP 250 OP 636: Performed by: NURSE ANESTHETIST, CERTIFIED REGISTERED

## 2019-05-28 PROCEDURE — 88305 TISSUE EXAM BY PATHOLOGIST: CPT | Mod: 26 | Performed by: SURGERY

## 2019-05-28 PROCEDURE — 25000125 ZZHC RX 250: Performed by: NURSE ANESTHETIST, CERTIFIED REGISTERED

## 2019-05-28 PROCEDURE — 25000125 ZZHC RX 250: Performed by: SURGERY

## 2019-05-28 PROCEDURE — 25800030 ZZH RX IP 258 OP 636: Performed by: SURGERY

## 2019-05-28 PROCEDURE — 37000008 ZZH ANESTHESIA TECHNICAL FEE, 1ST 30 MIN: Performed by: SURGERY

## 2019-05-28 PROCEDURE — 88305 TISSUE EXAM BY PATHOLOGIST: CPT | Performed by: SURGERY

## 2019-05-28 RX ORDER — SODIUM CHLORIDE, SODIUM LACTATE, POTASSIUM CHLORIDE, CALCIUM CHLORIDE 600; 310; 30; 20 MG/100ML; MG/100ML; MG/100ML; MG/100ML
INJECTION, SOLUTION INTRAVENOUS CONTINUOUS
Status: DISCONTINUED | OUTPATIENT
Start: 2019-05-28 | End: 2019-05-28 | Stop reason: HOSPADM

## 2019-05-28 RX ORDER — ONDANSETRON 2 MG/ML
4 INJECTION INTRAMUSCULAR; INTRAVENOUS
Status: DISCONTINUED | OUTPATIENT
Start: 2019-05-28 | End: 2019-05-28 | Stop reason: HOSPADM

## 2019-05-28 RX ORDER — PROPOFOL 10 MG/ML
INJECTION, EMULSION INTRAVENOUS CONTINUOUS PRN
Status: DISCONTINUED | OUTPATIENT
Start: 2019-05-28 | End: 2019-05-28

## 2019-05-28 RX ORDER — GLYCOPYRROLATE 0.2 MG/ML
INJECTION, SOLUTION INTRAMUSCULAR; INTRAVENOUS PRN
Status: DISCONTINUED | OUTPATIENT
Start: 2019-05-28 | End: 2019-05-28

## 2019-05-28 RX ORDER — LIDOCAINE HYDROCHLORIDE 10 MG/ML
INJECTION, SOLUTION EPIDURAL; INFILTRATION; INTRACAUDAL; PERINEURAL PRN
Status: DISCONTINUED | OUTPATIENT
Start: 2019-05-28 | End: 2019-05-28

## 2019-05-28 RX ORDER — PROPOFOL 10 MG/ML
INJECTION, EMULSION INTRAVENOUS PRN
Status: DISCONTINUED | OUTPATIENT
Start: 2019-05-28 | End: 2019-05-28

## 2019-05-28 RX ORDER — LIDOCAINE 40 MG/G
CREAM TOPICAL
Status: DISCONTINUED | OUTPATIENT
Start: 2019-05-28 | End: 2019-05-28 | Stop reason: HOSPADM

## 2019-05-28 RX ADMIN — LIDOCAINE HYDROCHLORIDE 1 ML: 10 INJECTION, SOLUTION EPIDURAL; INFILTRATION; INTRACAUDAL; PERINEURAL at 06:48

## 2019-05-28 RX ADMIN — LIDOCAINE HYDROCHLORIDE 70 MG: 10 INJECTION, SOLUTION EPIDURAL; INFILTRATION; INTRACAUDAL; PERINEURAL at 07:33

## 2019-05-28 RX ADMIN — PROPOFOL 200 MCG/KG/MIN: 10 INJECTION, EMULSION INTRAVENOUS at 07:34

## 2019-05-28 RX ADMIN — PROPOFOL 100 MG: 10 INJECTION, EMULSION INTRAVENOUS at 07:34

## 2019-05-28 RX ADMIN — SODIUM CHLORIDE, POTASSIUM CHLORIDE, SODIUM LACTATE AND CALCIUM CHLORIDE: 600; 310; 30; 20 INJECTION, SOLUTION INTRAVENOUS at 06:48

## 2019-05-28 RX ADMIN — GLYCOPYRROLATE 0.2 MG: 0.2 INJECTION, SOLUTION INTRAMUSCULAR; INTRAVENOUS at 07:33

## 2019-05-28 ASSESSMENT — MIFFLIN-ST. JEOR: SCORE: 1189.94

## 2019-05-28 NOTE — ANESTHESIA POSTPROCEDURE EVALUATION
Patient: Kendra Montenegro    Procedure(s):  ESOPHAGOGASTRODUODENOSCOPY, WITH BIOPSY    Diagnosis:epigastric pain  diagnostic  Diagnosis Additional Information: No value filed.    Anesthesia Type:  MAC    Note:  Anesthesia Post Evaluation    Patient location during evaluation: Bedside  Patient participation: Able to fully participate in evaluation  Level of consciousness: awake and alert  Pain management: adequate  Airway patency: patent  Cardiovascular status: acceptable  Respiratory status: acceptable  Hydration status: acceptable  PONV: none     Anesthetic complications: None          Last vitals:  Vitals:    05/28/19 0620 05/28/19 0647   BP: 125/84    Pulse:  64   Resp: 16    Temp: 36.6  C (97.9  F)    SpO2: 100%          Electronically Signed By: JUAN Mae CRNA  May 28, 2019  7:54 AM

## 2019-05-28 NOTE — BRIEF OP NOTE
[facility]    Brief Operative Note    [facility]   Brief Operative Note    Pre-operative diagnosis: epigastric pain  diagnostic   Post-operative diagnosis normal-appearing EGD     Procedure: Procedure(s):  ESOPHAGOGASTRODUODENOSCOPY (EGD)   Surgeon(s): Surgeon(s) and Role:     * Benjamin Moran MD - Primary   Estimated blood loss: * No values recorded between 5/28/2019 12:00 AM and 5/28/2019  7:45 AM *    Specimens: ID Type Source Tests Collected by Time Destination   A : for h pylori Tissue Stomach, Antrum SURGICAL PATHOLOGY EXAM Benjamin Moran MD 5/28/2019  7:41 AM       Findings: 1. Normal esophagus  2. z-line regular at 38 cm  3. Stomach normal - biopsied for h.pylori.  4. Duodenum normal

## 2019-05-28 NOTE — H&P
48 year old year old female here for upper endoscopy for evaluation of abdominal pain.        Patient Active Problem List   Diagnosis     Thyromegaly     Hypothyroidism     Iron deficiency anemia due to chronic blood loss     CARDIOVASCULAR SCREENING; LDL GOAL LESS THAN 160     Anemia     Thyroid nodule     Lichen sclerosus     Dyspareunia in female       Past Medical History:   Diagnosis Date     Hx of abnormal Pap smear ??    possible CRYO in the past?     Thyroid disease        Past Surgical History:   Procedure Laterality Date     CYSTOSCOPY N/A 1/11/2016    Procedure: CYSTOSCOPY;  Surgeon: Juan Jose Burgess MD;  Location: [facility]     DILATION AND CURETTAGE, HYSTEROSCOPY DIAGNOSTIC, COMBINED N/A 10/2/2015    Procedure: COMBINED DILATION AND CURETTAGE, HYSTEROSCOPY DIAGNOSTIC;  Surgeon: Juan Jose Burgess MD;  Location: [facility] OR     HYSTERECTOMY, PAP NO LONGER INDICATED  1/11/2016     LAPAROSCOPIC HYSTERECTOMY TOTAL, BILATERAL SALPINGO-OOPHORECTOMY, COMBINED N/A 1/11/2016    Procedure: COMBINED LAPAROSCOPIC HYSTERECTOMY TOTAL, SALPINGO-OOPHORECTOMY;  Surgeon: Juan Jose Burgess MD;  Location: [facility] OR     SURGICAL HISTORY OF -   1999    removal of endometriosis,L ovary and a fallopian tube     TUBAL LIGATION         History reviewed. No pertinent family history.    No current outpatient medications on file.       Allergies   Allergen Reactions     Nka [No Known Allergies]        Pt reports that she has never smoked. She has never used smokeless tobacco. She reports that she does not drink alcohol or use drugs.    Exam:    Awake, Alert OX3  Lungs - CTA bilaterally  CV - RRR, no murmurs, distal pulses intact  Abd - soft, non-distended, non-tender, +BS  Extr - No cyanosis or edema    A/P 48 year old year old female in need of upper endoscopy for evaluation of abdominal pain. Risks, benefits, alternatives, and complications were discussed including the possibility of perforation and the patient agreed to  proceed.    Benjamin Moran MD

## 2019-05-29 LAB — COPATH REPORT: NORMAL

## 2019-07-15 DIAGNOSIS — E06.3 HYPOTHYROIDISM DUE TO HASHIMOTO'S THYROIDITIS: ICD-10-CM

## 2019-07-15 RX ORDER — LEVOTHYROXINE SODIUM 125 UG/1
TABLET ORAL
Qty: 30 TABLET | Refills: 0 | Status: SHIPPED | OUTPATIENT
Start: 2019-07-15 | End: 2020-01-20 | Stop reason: DRUGHIGH

## 2019-07-15 NOTE — TELEPHONE ENCOUNTER
"Requested Prescriptions   Pending Prescriptions Disp Refills     levothyroxine (SYNTHROID/LEVOTHROID) 125 MCG tablet [Pharmacy Med Name: LEVOTHYROXINE 0.125MG (125MCG) TAB]  Last Written Prescription Date:  2/18/19  Last Fill Quantity: 90,  # refills: 0   Last office visit: 5/17/2019 with prescribing provider:  Gabriele   Future Office Visit:     90 tablet 0     Sig: TAKE 1 TABLET BY MOUTH DAILY       Thyroid Protocol Failed - 7/15/2019  9:03 AM        Failed - Normal TSH on file in past 12 months     Recent Labs   Lab Test 05/30/18  0755   TSH 1.76              Passed - Patient is 12 years or older        Passed - Recent (12 mo) or future (30 days) visit within the authorizing provider's specialty     Patient had office visit in the last 12 months or has a visit in the next 30 days with authorizing provider or within the authorizing provider's specialty.  See \"Patient Info\" tab in inbasket, or \"Choose Columns\" in Meds & Orders section of the refill encounter.              Passed - Medication is active on med list        Passed - No active pregnancy on record     If patient is pregnant or has had a positive pregnancy test, please check TSH.          Passed - No positive pregnancy test in past 12 months     If patient is pregnant or has had a positive pregnancy test, please check TSH.            "

## 2019-07-15 NOTE — TELEPHONE ENCOUNTER
Routing refill request to provider for review/approval because: Labs not current:  TSH    Suze YANG RN

## 2019-07-16 NOTE — TELEPHONE ENCOUNTER
Filled for 1 month.  Please call pt and let her know she is due for her TSH check.  Lab order available.    Rosario Suazo

## 2019-08-28 DIAGNOSIS — E06.3 HYPOTHYROIDISM DUE TO HASHIMOTO'S THYROIDITIS: ICD-10-CM

## 2019-08-28 NOTE — TELEPHONE ENCOUNTER
I left a message for the patient to return call to [facility].  She was advised on 7/17/19 to come in for thyroid recheck and has not come in.  CSS please help her schedule a lab only appt. (non-fasting).    Suze YANG RN

## 2019-08-28 NOTE — TELEPHONE ENCOUNTER
"Requested Prescriptions   Pending Prescriptions Disp Refills     levothyroxine (SYNTHROID/LEVOTHROID) 125 MCG tablet [Pharmacy Med Name: LEVOTHYROXINE 0.125MG (125MCG) TAB]  Last Written Prescription Date:  7/15/19  Last Fill Quantity: 30,  # refills: 0   Last office visit: 5/17/2019 with prescribing provider:  dillon   Future Office Visit:     30 tablet 0     Sig: TAKE 1 TABLET BY MOUTH DAILY       Thyroid Protocol Failed - 8/28/2019  7:14 AM        Failed - Normal TSH on file in past 12 months     Recent Labs   Lab Test 05/30/18  0755   TSH 1.76              Passed - Patient is 12 years or older        Passed - Recent (12 mo) or future (30 days) visit within the authorizing provider's specialty     Patient had office visit in the last 12 months or has a visit in the next 30 days with authorizing provider or within the authorizing provider's specialty.  See \"Patient Info\" tab in inbasket, or \"Choose Columns\" in Meds & Orders section of the refill encounter.              Passed - Medication is active on med list        Passed - No active pregnancy on record     If patient is pregnant or has had a positive pregnancy test, please check TSH.          Passed - No positive pregnancy test in past 12 months     If patient is pregnant or has had a positive pregnancy test, please check TSH.            "

## 2019-08-29 DIAGNOSIS — E06.3 HYPOTHYROIDISM DUE TO HASHIMOTO'S THYROIDITIS: ICD-10-CM

## 2019-08-29 LAB — TSH SERPL DL<=0.005 MIU/L-ACNC: 11.94 MU/L (ref 0.4–4)

## 2019-08-29 PROCEDURE — 36415 COLL VENOUS BLD VENIPUNCTURE: CPT | Performed by: FAMILY MEDICINE

## 2019-08-29 PROCEDURE — 84443 ASSAY THYROID STIM HORMONE: CPT | Performed by: FAMILY MEDICINE

## 2019-08-29 NOTE — TELEPHONE ENCOUNTER
Patient will come in today to have her TSH done as she is taking her last pill today.    Kamilla Carrington RN

## 2019-08-30 RX ORDER — LEVOTHYROXINE SODIUM 137 UG/1
137 TABLET ORAL DAILY
Qty: 90 TABLET | Refills: 0 | Status: SHIPPED | OUTPATIENT
Start: 2019-08-30 | End: 2019-12-23

## 2019-08-30 RX ORDER — LEVOTHYROXINE SODIUM 125 UG/1
TABLET ORAL
Qty: 30 TABLET | Refills: 0 | OUTPATIENT
Start: 2019-08-30

## 2019-12-16 ENCOUNTER — OFFICE VISIT (OUTPATIENT)
Dept: FAMILY MEDICINE | Facility: CLINIC | Age: 49
End: 2019-12-16
Payer: COMMERCIAL

## 2019-12-16 VITALS
TEMPERATURE: 96.6 F | SYSTOLIC BLOOD PRESSURE: 114 MMHG | DIASTOLIC BLOOD PRESSURE: 75 MMHG | WEIGHT: 138 LBS | RESPIRATION RATE: 12 BRPM | HEART RATE: 68 BPM | BODY MASS INDEX: 24.45 KG/M2 | HEIGHT: 63 IN

## 2019-12-16 DIAGNOSIS — Z23 NEED FOR VACCINATION: ICD-10-CM

## 2019-12-16 DIAGNOSIS — N94.10 DYSPAREUNIA IN FEMALE: ICD-10-CM

## 2019-12-16 DIAGNOSIS — E06.3 HYPOTHYROIDISM DUE TO HASHIMOTO'S THYROIDITIS: ICD-10-CM

## 2019-12-16 DIAGNOSIS — Z00.00 ROUTINE GENERAL MEDICAL EXAMINATION AT A HEALTH CARE FACILITY: Primary | ICD-10-CM

## 2019-12-16 DIAGNOSIS — Z12.31 ENCOUNTER FOR SCREENING MAMMOGRAM FOR BREAST CANCER: ICD-10-CM

## 2019-12-16 LAB — TSH SERPL DL<=0.005 MIU/L-ACNC: 3.09 MU/L (ref 0.4–4)

## 2019-12-16 PROCEDURE — 90715 TDAP VACCINE 7 YRS/> IM: CPT | Performed by: FAMILY MEDICINE

## 2019-12-16 PROCEDURE — 99396 PREV VISIT EST AGE 40-64: CPT | Mod: 25 | Performed by: FAMILY MEDICINE

## 2019-12-16 PROCEDURE — 84443 ASSAY THYROID STIM HORMONE: CPT | Performed by: FAMILY MEDICINE

## 2019-12-16 PROCEDURE — 90471 IMMUNIZATION ADMIN: CPT | Performed by: FAMILY MEDICINE

## 2019-12-16 PROCEDURE — 36415 COLL VENOUS BLD VENIPUNCTURE: CPT | Performed by: FAMILY MEDICINE

## 2019-12-16 ASSESSMENT — PAIN SCALES - GENERAL: PAINLEVEL: NO PAIN (0)

## 2019-12-16 ASSESSMENT — MIFFLIN-ST. JEOR: SCORE: 1212.15

## 2019-12-16 NOTE — PATIENT INSTRUCTIONS
Please schedule to see Dr Burgess for the pain with intercourse    I will let you know the thyroid results when available.    You are due for your mammogram in June.  You can call [phone] to get scheduled.      Preventive Health Recommendations  Female Ages 40 to 49    Yearly exam:     See your health care provider every year in order to  1. Review health changes.   2. Discuss preventive care.    3. Review your medicines if your doctor prescribed any.      Get a Pap test every three years (unless you have an abnormal result and your provider advises testing more often).      If you get Pap tests with HPV test, you only need to test every 5 years, unless you have an abnormal result. You do not need a Pap test if your uterus was removed (hysterectomy) and you have not had cancer.      You should be tested each year for STDs (sexually transmitted diseases), if you're at risk.     Ask your doctor if you should have a mammogram.      Have a colonoscopy (test for colon cancer) if someone in your family has had colon cancer or polyps before age 50.       Have a cholesterol test every 5 years.       Have a diabetes test (fasting glucose) after age 45. If you are at risk for diabetes, you should have this test every 3 years.    Shots: Get a flu shot each year. Get a tetanus shot every 10 years.     Nutrition:     Eat at least 5 servings of fruits and vegetables each day.    Eat whole-grain bread, whole-wheat pasta and brown rice instead of white grains and rice.    Get adequate Calcium and Vitamin D.      Lifestyle    Exercise at least 150 minutes a week (an average of 30 minutes a day, 5 days a week). This will help you control your weight and prevent disease.    Limit alcohol to one drink per day.    No smoking.     Wear sunscreen to prevent skin cancer.    See your dentist every six months for an exam and cleaning.

## 2019-12-16 NOTE — LETTER
December 16, 2019      Kendra Montenegro  [address] [place] [id number]        Dear MsZaire,    We are writing to inform you of your test results.        Your thyroid is back to normal.  Let's continue this dosing.      Resulted Orders   TSH   Result Value Ref Range    TSH 3.09 0.40 - 4.00 mU/L       If you have any questions or concerns, please call the clinic at the number listed above.       Sincerely,        Rosario Suazo, DO/am

## 2019-12-16 NOTE — PROGRESS NOTES
SUBJECTIVE:   CC: Kendra Montenegro is an 49 year old woman who presents for preventive health visit.     Healthy Habits:    Do you get at least three servings of calcium containing foods daily (dairy, green leafy vegetables, etc.)? no, taking calcium and/or vitamin D supplement: yes     Amount of exercise or daily activities, outside of work: 1 day(s) per week    Problems taking medications regularly No    Medication side effects: No    Have you had an eye exam in the past two years? yes    Do you see a dentist twice per year? yes    Do you have sleep apnea, excessive snoring or daytime drowsiness?no      Did use the clobetasol twice weekly for a period after our visit in May but now has been off for some time.  Still with pain with intercourse.  Did not see gyn due to having her EGD.  Willing to reschedule.        Today's PHQ-2 Score:   PHQ-2 ( 1999 [employer]) 12/16/2019 5/31/2018   Q1: Little interest or pleasure in doing things 0 0   Q2: Feeling down, depressed or hopeless 0 0   PHQ-2 Score 0 0       Abuse: Current or Past(Physical, Sexual or Emotional)- No  Do you feel safe in your environment? Yes        Social History     Tobacco Use     Smoking status: Never Smoker     Smokeless tobacco: Never Used   Substance Use Topics     Alcohol use: No     If you drink alcohol do you typically have >3 drinks per day or >7 drinks per week? No                     Reviewed orders with patient.  Reviewed health maintenance and updated orders accordingly - Yes      Mammogram Screening: Patient under age 50, mutual decision reflected in health maintenance.      Pertinent mammograms are reviewed under the imaging tab.  History of abnormal Pap smear: Status post benign hysterectomy. Health Maintenance and Surgical History updated.  PAP / HPV 12/4/2014   PAP NIL     Reviewed and updated as needed this visit by clinical staff  Tobacco  Allergies  Meds  Med Hx  Surg Hx  Fam Hx  Soc Hx        Reviewed and updated as needed this  visit by Provider  Tobacco  Allergies  Meds  Med Hx  Surg Hx  Fam Hx  Soc Hx       Past Medical History:   Diagnosis Date     Hx of abnormal Pap smear ??    possible CRYO in the past?     Thyroid disease       Past Surgical History:   Procedure Laterality Date     CYSTOSCOPY N/A 1/11/2016    Procedure: CYSTOSCOPY;  Surgeon: Juan Jose Burgess MD;  Location: [facility]     DILATION AND CURETTAGE, HYSTEROSCOPY DIAGNOSTIC, COMBINED N/A 10/2/2015    Procedure: COMBINED DILATION AND CURETTAGE, HYSTEROSCOPY DIAGNOSTIC;  Surgeon: Juan Jose Burgess MD;  Location: [facility]     ESOPHAGOSCOPY, GASTROSCOPY, DUODENOSCOPY (EGD), COMBINED N/A 5/28/2019    Procedure: ESOPHAGOGASTRODUODENOSCOPY, WITH BIOPSY;  Surgeon: Benjamin Moran MD;  Location: [facility]     HYSTERECTOMY, PAP NO LONGER INDICATED  1/11/2016     LAPAROSCOPIC HYSTERECTOMY TOTAL, BILATERAL SALPINGO-OOPHORECTOMY, COMBINED N/A 1/11/2016    Procedure: COMBINED LAPAROSCOPIC HYSTERECTOMY TOTAL, SALPINGO-OOPHORECTOMY;  Surgeon: Juan Jose Burgess MD;  Location: [facility]     SURGICAL HISTORY OF -   1999    removal of endometriosis,L ovary and a fallopian tube     TUBAL LIGATION         ROS:  CONSTITUTIONAL: NEGATIVE for fever, chills, change in weight  INTEGUMENTARY/SKIN: NEGATIVE for worrisome rashes, moles or lesions  EYES: NEGATIVE for vision changes or irritation  ENT: NEGATIVE for ear, mouth and throat problems  RESP: NEGATIVE for significant cough or SOB  BREAST: NEGATIVE for masses, tenderness or discharge  CV: NEGATIVE for chest pain, palpitations or peripheral edema  GI: NEGATIVE for nausea, abdominal pain, heartburn, or change in bowel habits  : NEGATIVE for unusual urinary or vaginal symptoms. No vaginal bleeding.  POSITIVE for pain with intercourse.  MUSCULOSKELETAL: NEGATIVE for significant arthralgias or myalgia  NEURO: NEGATIVE for weakness, dizziness or paresthesias  PSYCHIATRIC: NEGATIVE for changes in mood or affect     OBJECTIVE:   /75 (BP  "Location: Right arm, Patient Position: Chair, Cuff Size: Adult Large)   Pulse 68   Temp 96.6  F (35.9  C) (Tympanic)   Resp 12   Ht 1.588 m (5' 2.5\")   Wt 62.6 kg (138 lb)   LMP 11/29/2015 (LMP Unknown)   Breastfeeding No   BMI 24.84 kg/m    EXAM:  GENERAL APPEARANCE: healthy, alert and no distress  EYES: Eyes grossly normal to inspection, PERRL and conjunctivae and sclerae normal  HENT: ear canals and TM's normal, nose and mouth without ulcers or lesions, oropharynx clear and oral mucous membranes moist  NECK: no adenopathy, no asymmetry, masses, or scars and thyroid normal to palpation  RESP: lungs clear to auscultation - no rales, rhonchi or wheezes  BREAST: normal without masses, tenderness or nipple discharge and no palpable axillary masses or adenopathy  CV: regular rate and rhythm, normal S1 S2, no S3 or S4, no murmur, click or rub, no peripheral edema and peripheral pulses strong  ABDOMEN: soft, nontender, no hepatosplenomegaly, no masses and bowel sounds normal  MS: no musculoskeletal defects are noted and gait is age appropriate without ataxia  NEURO: Normal strength and tone, sensory exam grossly normal, mentation intact and speech normal  PSYCH: mentation appears normal and affect normal/bright    Diagnostic Test Results:  Labs reviewed in Epic    ASSESSMENT/PLAN:       ICD-10-CM    1. Routine general medical examination at a health care facility Z00.00    2. Hypothyroidism due to Hashimoto's thyroiditis E03.8 TSH    E06.3    3. Dyspareunia in female N94.10    4. Need for vaccination Z23    5. Encounter for screening mammogram for breast cancer Z12.31 MA Screening Digital Bilateral       COUNSELING:   Reviewed preventive health counseling, as reflected in patient instructions  Special attention given to:        Regular exercise       Healthy diet/nutrition       Osteoporosis Prevention/Bone Health       Colon cancer screening       (Celia)menopause management    Estimated body mass index is 24.84 " "kg/m  as calculated from the following:    Height as of this encounter: 1.588 m (5' 2.5\").    Weight as of this encounter: 62.6 kg (138 lb).         reports that she has never smoked. She has never used smokeless tobacco.      Counseling Resources:  ATP IV Guidelines  Pooled Cohorts Equation Calculator  Breast Cancer Risk Calculator  FRAX Risk Assessment  ICSI Preventive Guidelines  Dietary Guidelines for Americans, 2010  USDA's MyPlate  ASA Prophylaxis  Lung CA Screening    Rosario Suazo DO  [facility]  "

## 2019-12-19 DIAGNOSIS — E06.3 HYPOTHYROIDISM DUE TO HASHIMOTO'S THYROIDITIS: ICD-10-CM

## 2019-12-20 NOTE — TELEPHONE ENCOUNTER
"Requested Prescriptions   Pending Prescriptions Disp Refills     levothyroxine (SYNTHROID/LEVOTHROID) 137 MCG tablet [Pharmacy Med Name: LEVOTHYROXINE 0.137MG (137MCG) TAB] 90 tablet 0     Sig: TAKE 1 TABLET(137 MCG) BY MOUTH DAILY  Last Written Prescription Date:  08/30/2019 #90 x 0  Last filled - not provided  Last office visit: 12/16/2019 CHARLES Suazo     Future Office Visit:   Next 5 appointments (look out 90 days)    Jan 07, 2020 10:30 AM CST  Office Visit with Juan Jose Burgess MD  [facility] ([facility]) [address] [id number]  [phone]                Thyroid Protocol Passed - 12/19/2019 12:00 PM        Passed - Patient is 12 years or older        Passed - Recent (12 mo) or future (30 days) visit within the authorizing provider's specialty     Patient has had an office visit with the authorizing provider or a provider within the authorizing providers department within the previous 12 mos or has a future within next 30 days. See \"Patient Info\" tab in inbasket, or \"Choose Columns\" in Meds & Orders section of the refill encounter.              Passed - Medication is active on med list        Passed - Normal TSH on file in past 12 months     Recent Labs   Lab Test 12/16/19  1030   TSH 3.09              Passed - No active pregnancy on record     If patient is pregnant or has had a positive pregnancy test, please check TSH.          Passed - No positive pregnancy test in past 12 months     If patient is pregnant or has had a positive pregnancy test, please check TSH.            "

## 2019-12-23 RX ORDER — LEVOTHYROXINE SODIUM 137 UG/1
TABLET ORAL
Qty: 90 TABLET | Refills: 1 | Status: SHIPPED | OUTPATIENT
Start: 2019-12-23 | End: 2020-08-18

## 2020-01-07 ENCOUNTER — OFFICE VISIT (OUTPATIENT)
Dept: OBGYN | Facility: CLINIC | Age: 50
End: 2020-01-07
Payer: COMMERCIAL

## 2020-01-07 VITALS
BODY MASS INDEX: 24.45 KG/M2 | SYSTOLIC BLOOD PRESSURE: 119 MMHG | HEIGHT: 63 IN | WEIGHT: 138 LBS | RESPIRATION RATE: 20 BRPM | DIASTOLIC BLOOD PRESSURE: 74 MMHG | TEMPERATURE: 97.3 F | HEART RATE: 74 BPM

## 2020-01-07 DIAGNOSIS — N94.10 DYSPAREUNIA IN FEMALE: Primary | ICD-10-CM

## 2020-01-07 PROCEDURE — 99201 ZZC OFFICE/OUTPT VISIT, NEW, LEVEL I: CPT | Performed by: OBSTETRICS & GYNECOLOGY

## 2020-01-07 RX ORDER — ESTRADIOL 0.5 MG/1
TABLET ORAL
Qty: 60 TABLET | Refills: 3 | Status: SHIPPED | OUTPATIENT
Start: 2020-01-07 | End: 2021-02-17

## 2020-01-07 ASSESSMENT — MIFFLIN-ST. JEOR: SCORE: 1212.15

## 2020-01-07 NOTE — PROGRESS NOTES
1 1/2 years of lack of sex drive and vaginal dryness  Restore burns   No Fm hx of breast disease  No discharge     CC:  Follow up  from herself for discussion of health concerns  HPI:  Kendra Montenegro is a 49 year old female is a   .  Patient's last menstrual period was 11/29/2015 (lmp unknown).    She has a history of hysterectomy also clinically is postmenopausal.    Patients records are available and reviewed at today's visit.    Past GYN history:  No STD history       Last PAP smear:  Normal  Last TSH:   TSH   Date Value Ref Range Status   12/16/2019 3.09 0.40 - 4.00 mU/L Final    , normal?  Yes    Past Medical History:   Diagnosis Date     Hx of abnormal Pap smear ??    possible CRYO in the past?     Thyroid disease        Past Surgical History:   Procedure Laterality Date     CYSTOSCOPY N/A 1/11/2016    Procedure: CYSTOSCOPY;  Surgeon: Juan Jose Burgess MD;  Location: [facility]     DILATION AND CURETTAGE, HYSTEROSCOPY DIAGNOSTIC, COMBINED N/A 10/2/2015    Procedure: COMBINED DILATION AND CURETTAGE, HYSTEROSCOPY DIAGNOSTIC;  Surgeon: Juan Jose Burgess MD;  Location: [facility]     ESOPHAGOSCOPY, GASTROSCOPY, DUODENOSCOPY (EGD), COMBINED N/A 5/28/2019    Procedure: ESOPHAGOGASTRODUODENOSCOPY, WITH BIOPSY;  Surgeon: Benjamin Moran MD;  Location: [facility]     HYSTERECTOMY, PAP NO LONGER INDICATED  1/11/2016     LAPAROSCOPIC HYSTERECTOMY TOTAL, BILATERAL SALPINGO-OOPHORECTOMY, COMBINED N/A 1/11/2016    Procedure: COMBINED LAPAROSCOPIC HYSTERECTOMY TOTAL, SALPINGO-OOPHORECTOMY;  Surgeon: Juan Jose Burgess MD;  Location: [facility]     SURGICAL HISTORY OF -   1999    removal of endometriosis,L ovary and a fallopian tube     TUBAL LIGATION         History reviewed. No pertinent family history.    Allergies: Nka [no known allergies]    Current Outpatient Medications   Medication Sig Dispense Refill     clobetasol (TEMOVATE) 0.05 % external cream Apply sparingly to affected area twice weekly.    Do not apply to face. 15  "g 1     estradiol (ESTRACE) 0.5 MG tablet I tablet vaginally at bedtime 3 times weekly 60 tablet 3     levothyroxine (SYNTHROID/LEVOTHROID) 137 MCG tablet TAKE 1 TABLET(137 MCG) BY MOUTH DAILY 90 tablet 1     Multiple vitamin TABS Take 1 tablet by mouth daily        levothyroxine (SYNTHROID/LEVOTHROID) 125 MCG tablet TAKE 1 TABLET BY MOUTH DAILY (Patient not taking: Reported on 12/16/2019) 30 tablet 0       ROS:  C: NEGATIVE for fever, chills, change in weight  I: NEGATIVE for worrisome rashes, moles or lesions  E: NEGATIVE for vision changes or irritation  E/M: NEGATIVE for ear, mouth and throat problems  R: NEGATIVE for significant cough or SOB  CV: NEGATIVE for chest pain, palpitations or peripheral edema  GI: NEGATIVE for nausea, abdominal pain, heartburn, or change in bowel habits  : NEGATIVE for frequency, dysuria, hematuria, vaginal discharge  M: NEGATIVE for significant arthralgias or myalgia  N: NEGATIVE for weakness, dizziness or paresthesias  E: NEGATIVE for temperature intolerance, skin/hair changes  P: NEGATIVE for changes in mood or affect    EXAM:  Blood pressure 119/74, pulse 74, temperature 97.3  F (36.3  C), resp. rate 20, height 1.588 m (5' 2.5\"), weight 62.6 kg (138 lb), last menstrual period 11/29/2015, not currently breastfeeding.   BMI= Body mass index is 24.84 kg/m .  General - pleasant female in no acute distress.  No exam today    ASSESSMENT/PLAN:  (N94.10) Dyspareunia in female  (primary encounter diagnosis)  Comment: We reviewed the risks and benefits of estrogen replacement therapy especially vaginal application.  And recommending that she is released have a trial of vaginal estrogen.  I discussed with her the other patients that I have seen who are living with breast cancer, and the situations in which, their oncologist have recommended vaginal estrogen for similar symptoms.  Those patients have  done very well with that course of treatment      Plan: estradiol (ESTRACE) 0.5 MG " tablet                Letter will be sent to the referring provider.    Juan Jose Burgess MD  15 of 15 minutes spent Face to face counseling relative to the issues noted above.

## 2020-01-19 ENCOUNTER — NURSE TRIAGE (OUTPATIENT)
Dept: NURSING | Facility: CLINIC | Age: 50
End: 2020-01-19

## 2020-01-19 ENCOUNTER — MYC MEDICAL ADVICE (OUTPATIENT)
Dept: FAMILY MEDICINE | Facility: CLINIC | Age: 50
End: 2020-01-19

## 2020-01-19 NOTE — TELEPHONE ENCOUNTER
S: Calling about possible cellulitis.   B: In 2018 had left sided facial cellulitis, which took 3 different antibiotics before it resolved.  Today the patient believes she has the start of cellulitis on the left side of her face,  Currently had swelling on the bridge of her nose and down left side of nose. There has been no trauma to the nose.  Patient has requested to page on call provider.  A: Writer pages on call provider Dr. Brown at 1315 to call patient at home [phone].   R:Caller advised to call back if they have not heard back from on call provider by 1400.  Caller appears to understand directions  and agrees with plan.  Lizzy Gonzalez RN, [facility]      Reason for Disposition    [1] Swelling is red AND [2] very painful to touch    Additional Information    Negative: [1] Life-threatening reaction (anaphylaxis) in the past to similar substance (e.g., food, insect bite/sting, chemical, etc.) AND [2] < 2 hours since exposure    Negative: Unresponsive, passed out or very weak    Negative: Swollen tongue    Negative: Difficulty breathing or wheezing    Negative: Sounds like a life-threatening emergency to the triager    Negative: [1] SEVERE swelling of entire face AND [2] < 2 hours since exposure to high-risk allergen (e.g., peanuts, tree nuts, fish, shellfish or 1st dose of drug) AND [3] no serious symptoms AND [4] no serious allergic reaction in the past    Negative: Fever    Negative: Taking an ACE Inhibitor medication  (e.g., benazepril/LOTENSIN, captopril/CAPOTEN, enalapril/VASOTEC, lisinopril/ZESTRIL)    Negative: Patient sounds very sick or weak to the triager    Negative: Pregnant > 20 weeks    Negative: Postpartum (i.e. < 1 month since delivery)    Negative: SEVERE swelling of the entire face    Protocols used: FACE SWELLING-A-AH

## 2020-01-20 ENCOUNTER — OFFICE VISIT (OUTPATIENT)
Dept: FAMILY MEDICINE | Facility: CLINIC | Age: 50
End: 2020-01-20
Payer: COMMERCIAL

## 2020-01-20 VITALS
TEMPERATURE: 97.4 F | SYSTOLIC BLOOD PRESSURE: 112 MMHG | WEIGHT: 143 LBS | BODY MASS INDEX: 25.34 KG/M2 | HEIGHT: 63 IN | DIASTOLIC BLOOD PRESSURE: 74 MMHG | OXYGEN SATURATION: 99 % | HEART RATE: 67 BPM

## 2020-01-20 DIAGNOSIS — L03.211 CELLULITIS OF FACE: Primary | ICD-10-CM

## 2020-01-20 PROCEDURE — 99213 OFFICE O/P EST LOW 20 MIN: CPT | Performed by: NURSE PRACTITIONER

## 2020-01-20 RX ORDER — DOXYCYCLINE 100 MG/1
100 CAPSULE ORAL 2 TIMES DAILY
Qty: 20 CAPSULE | Refills: 0 | Status: SHIPPED | OUTPATIENT
Start: 2020-01-20 | End: 2020-01-30

## 2020-01-20 ASSESSMENT — ENCOUNTER SYMPTOMS: CONSTITUTIONAL NEGATIVE: 1

## 2020-01-20 ASSESSMENT — MIFFLIN-ST. JEOR: SCORE: 1234.83

## 2020-01-20 NOTE — PATIENT INSTRUCTIONS
Recurrent Cellulitis:  1. Take antibiotics as prescribed for the full course.  2. Take a probiotic and/or yogurt daily while on antibiotics and for about 1 week after stopping antibiotics.  3. If symptoms do not resolve or worsen, follow-up.

## 2020-01-20 NOTE — PROGRESS NOTES
Subjective     Kendra Montenegro is a 49 year old female who presents to clinic today for the following health issues:    Chief Complaint   Patient presents with     Infection     infection on the nose and cheek area for 4 days. Advil taken this morning.       HPI     In addition: Had similar symptoms in 2018. Bump/redness started on nose and rash/redness spread to cheeks. Was initially treated with abx, but had to have 2 different abx that finally cleared it up. Was dx with cellulitis. In January 2019, she had it reoccur and was treated again with doxycycline which cleared it up. She has seen an ENT in the past. She states last night the pain was so bad, she took 1000mg of PCN and again this morning. She states the rash has improved. She does not have any leftover abx left.     Patient Active Problem List   Diagnosis     Thyromegaly     Hypothyroidism     CARDIOVASCULAR SCREENING; LDL GOAL LESS THAN 160     Thyroid nodule     Lichen sclerosus     Dyspareunia in female     Past Surgical History:   Procedure Laterality Date     CYSTOSCOPY N/A 1/11/2016    Procedure: CYSTOSCOPY;  Surgeon: Juan Jose Burgess MD;  Location: [facility]     DILATION AND CURETTAGE, HYSTEROSCOPY DIAGNOSTIC, COMBINED N/A 10/2/2015    Procedure: COMBINED DILATION AND CURETTAGE, HYSTEROSCOPY DIAGNOSTIC;  Surgeon: Juan Jose Burgess MD;  Location: [facility]     ESOPHAGOSCOPY, GASTROSCOPY, DUODENOSCOPY (EGD), COMBINED N/A 5/28/2019    Procedure: ESOPHAGOGASTRODUODENOSCOPY, WITH BIOPSY;  Surgeon: Benjamin Moran MD;  Location: [facility]     HYSTERECTOMY, PAP NO LONGER INDICATED  1/11/2016     LAPAROSCOPIC HYSTERECTOMY TOTAL, BILATERAL SALPINGO-OOPHORECTOMY, COMBINED N/A 1/11/2016    Procedure: COMBINED LAPAROSCOPIC HYSTERECTOMY TOTAL, SALPINGO-OOPHORECTOMY;  Surgeon: Juan Jose Burgess MD;  Location: [facility]     SURGICAL HISTORY OF -   1999    removal of endometriosis,L ovary and a fallopian tube     TUBAL LIGATION         Social History     Tobacco Use  "    Smoking status: Never Smoker     Smokeless tobacco: Never Used   Substance Use Topics     Alcohol use: No     History reviewed. No pertinent family history.      Current Outpatient Medications   Medication Sig Dispense Refill     clobetasol (TEMOVATE) 0.05 % external cream Apply sparingly to affected area twice weekly.    Do not apply to face. 15 g 1     estradiol (ESTRACE) 0.5 MG tablet I tablet vaginally at bedtime 3 times weekly 60 tablet 3     levothyroxine (SYNTHROID/LEVOTHROID) 137 MCG tablet TAKE 1 TABLET(137 MCG) BY MOUTH DAILY 90 tablet 1     Multiple vitamin TABS Take 1 tablet by mouth daily        Allergies   Allergen Reactions     Nka [No Known Allergies]      BP Readings from Last 3 Encounters:   01/20/20 112/74   01/07/20 119/74   12/16/19 114/75    Wt Readings from Last 3 Encounters:   01/20/20 64.9 kg (143 lb)   01/07/20 62.6 kg (138 lb)   12/16/19 62.6 kg (138 lb)                      Reviewed and updated as needed this visit by Provider  Heavenly Mcfadden, DAMIAN, NP-C 1/20/2020 12:56 PM            Review of Systems   Constitutional: Negative.    HENT: Negative.    Skin: Positive for rash (erythema to bridge of nose and cheeks).            Objective    /74   Pulse 67   Temp 97.4  F (36.3  C) (Tympanic)   Ht 1.588 m (5' 2.5\")   Wt 64.9 kg (143 lb)   LMP 11/29/2015 (LMP Unknown)   SpO2 99%   BMI 25.74 kg/m    Body mass index is 25.74 kg/m .  Physical Exam  Vitals signs and nursing note reviewed.   Constitutional:       Appearance: Normal appearance. She is not ill-appearing.   HENT:      Head: Normocephalic and atraumatic.      Nose: Nose normal.   Skin:     General: Skin is warm and dry.      Findings: Erythema and rash (Slight lump on bridge of nose with slight surrounding erythema; no open areas) present. No lesion.   Neurological:      Mental Status: She is alert.            Diagnostic Test Results:  Labs reviewed in Epic; reviewed past medical records associated with current " complaints.        Assessment & Plan     1. Cellulitis of face  Recurrent facial cellulitis that historically clears up with doxycycline. She did take 2 doses of 500mg PCN in the last 24 hours she had left over. Advised against taking anymore PCN. Ordered doxycycline x 10 days (same dosing as worked previously). Recommended probiotic/yogurt during and after abx therapy to reduce GI upset. Follow-up if symptoms do not resolve with abx therapy.    - doxycycline hyclate (VIBRAMYCIN) 100 MG capsule; Take 1 capsule (100 mg) by mouth 2 times daily for 10 days  Dispense: 20 capsule; Refill: 0       Return in about 1 week (around 1/27/2020), or if symptoms worsen or fail to improve.    JUAN Moeller [facility]

## 2020-02-17 ENCOUNTER — HOSPITAL ENCOUNTER (OUTPATIENT)
Dept: MAMMOGRAPHY | Facility: CLINIC | Age: 50
Discharge: HOME | End: 2020-02-17
Attending: FAMILY MEDICINE | Admitting: FAMILY MEDICINE
Payer: COMMERCIAL

## 2020-02-17 DIAGNOSIS — Z12.31 ENCOUNTER FOR SCREENING MAMMOGRAM FOR BREAST CANCER: ICD-10-CM

## 2020-02-17 PROCEDURE — 77067 SCR MAMMO BI INCL CAD: CPT

## 2020-08-17 DIAGNOSIS — E06.3 HYPOTHYROIDISM DUE TO HASHIMOTO'S THYROIDITIS: ICD-10-CM

## 2020-08-18 RX ORDER — LEVOTHYROXINE SODIUM 137 UG/1
TABLET ORAL
Qty: 90 TABLET | Refills: 1 | Status: SHIPPED | OUTPATIENT
Start: 2020-08-18 | End: 2021-05-10

## 2020-10-09 DIAGNOSIS — L90.0 LICHEN SCLEROSUS: ICD-10-CM

## 2020-10-13 RX ORDER — CLOBETASOL PROPIONATE 0.5 MG/G
CREAM TOPICAL
Qty: 15 G | Refills: 1 | Status: SHIPPED | OUTPATIENT
Start: 2020-10-13 | End: 2021-05-04

## 2020-10-13 NOTE — TELEPHONE ENCOUNTER
Routing refill request to provider for review/approval because:  Drug not on the [facility] refill protocol   Clotilde Zimmerman RN

## 2020-11-07 ENCOUNTER — HEALTH MAINTENANCE LETTER (OUTPATIENT)
Age: 50
End: 2020-11-07

## 2020-12-03 ENCOUNTER — VIRTUAL VISIT (OUTPATIENT)
Dept: FAMILY MEDICINE | Facility: CLINIC | Age: 50
End: 2020-12-03
Payer: COMMERCIAL

## 2020-12-03 DIAGNOSIS — L03.211 CELLULITIS OF FACE: Primary | ICD-10-CM

## 2020-12-03 PROCEDURE — 99213 OFFICE O/P EST LOW 20 MIN: CPT | Mod: GT | Performed by: NURSE PRACTITIONER

## 2020-12-03 RX ORDER — DOXYCYCLINE 100 MG/1
100 CAPSULE ORAL 2 TIMES DAILY
Qty: 20 CAPSULE | Refills: 0 | Status: SHIPPED | OUTPATIENT
Start: 2020-12-03 | End: 2020-12-13

## 2020-12-03 NOTE — PATIENT INSTRUCTIONS
Cellulitis:  1. Warm compresses to the area 4-5 times a day.  2. Keep the area elevated (if applicable) and at rest.  3. Take antibiotic as prescribed.  4. Take probiotic or yogurt daily while on antibiotic to help prevent GI upset.   5. Take ibuprofen 800mg every 8 hours as needed for pain/swelling.  6. Wash with hibiclens every 1-2 weeks to help reduce bacteria on skin. Do not use regularly as it can be very drying.   7. Use lotion daily to help prevent drying/cracking of skin especially on face where cellulitis seems to be recurrent.

## 2020-12-03 NOTE — PROGRESS NOTES
"Kendra Montenegro is a 50 year old female who is being evaluated via a billable video visit.      The patient has been notified of following:     \"This video visit will be conducted via a call between you and your physician/provider. We have found that certain health care needs can be provided without the need for an in-person physical exam.  This service lets us provide the care you need with a video conversation.  If a prescription is necessary we can send it directly to your pharmacy.  If lab work is needed we can place an order for that and you can then stop by our lab to have the test done at a later time.    Video visits are billed at different rates depending on your insurance coverage.  Please reach out to your insurance provider with any questions.    If during the course of the call the physician/provider feels a video visit is not appropriate, you will not be charged for this service.\"    Patient has given verbal consent for Video visit? Yes  How would you like to obtain your AVS? [employer]  If you are dropped from the video visit, the video invite should be resent to: Send to e-mail at: [email or web address] she is logged into [employer].  Will anyone else be joining your video visit? No      Subjective     Kendra Montenegro is a 50 year old female who presents today via video visit for the following health issues:    HPI     Swelling in face  Onset/Duration: noticed yesterday  Description  Location: on face and into eyes  Character: swelling red, warm to the touch  Itching: moderate  Intensity:  moderate  Progression of Symptoms:  worsening and swelling has increased from dime size to baseball size   Accompanying signs and symptoms:   Fever: no  Body aches or joint pain: no  Sore throat symptoms: no  Recent cold symptoms: no  History:           Previous episodes of similar rash: yes, has been given antibiotics in the past.  New exposures:  None  Recent travel: no  Exposure to similar rash: no  Precipitating or " alleviating factors: none, possible internal staph infection that lies dormant and flares up sometimes.   Therapies tried and outcome  : none     Influenza vaccine has not received this season          Video Start Time: 7:13 AM    Additional provider notes: Swelling, redness, tenderness and warmth to right cheek bone/skin. Started a couple days ago as a zit, then yesterday noticed it swelled up by evening the size of a baseball. Usually gets this on the bridge of the nose and spreads to the left.     Review of Systems         Objective           Vitals:  No vitals were obtained today due to virtual visit.    Physical Exam     GENERAL: Healthy, alert and no distress  EYES: Eyes grossly normal to inspection.  No discharge or erythema, or obvious scleral/conjunctival abnormalities.  RESP: No audible wheeze, cough, or visible cyanosis.  No visible retractions or increased work of breathing.    SKIN: erythema to right cheek ~several inches in diameter  NEURO: Cranial nerves grossly intact.  Mentation and speech appropriate for age.  PSYCH: Mentation appears normal, affect normal/bright, judgement and insight intact, normal speech and appearance well-groomed.              Assessment & Plan     Cellulitis of face  Exam consistent with cellulitis. History of resistance to abx. Has had success with Doxycycline. Last facial cellulitis was January 2020. Doxycycline prescribed. Side effects, risks and benefits of medication were discussed with patient. Discussed how and when to take medication. Recommended taking a probiotic and/or eating yogurt every day while on antibiotics and for ~1 week after stopping antibiotics to prevent GI upset. Discussed OTC recommendations for symptom control.    - doxycycline hyclate (VIBRAMYCIN) 100 MG capsule; Take 1 capsule (100 mg) by mouth 2 times daily for 10 days     BMI:   Estimated body mass index is 25.74 kg/m  as calculated from the following:    Height as of 1/20/20: 1.588 m (5'  "2.5\").    Weight as of 1/20/20: 64.9 kg (143 lb).            Patient Instructions   Cellulitis:  1. Warm compresses to the area 4-5 times a day.  2. Keep the area elevated (if applicable) and at rest.  3. Take antibiotic as prescribed.  4. Take probiotic or yogurt daily while on antibiotic to help prevent GI upset.   5. Take ibuprofen 800mg every 8 hours as needed for pain/swelling.  6. Wash with hibiclens every 1-2 weeks to help reduce bacteria on skin. Do not use regularly as it can be very drying.   7. Use lotion daily to help prevent drying/cracking of skin especially on face where cellulitis seems to be recurrent.        Return if symptoms worsen or fail to improve.    JUAN Moeller CNP  [facility]      Video-Visit Details    Type of service:  Video Visit    Video End Time:7:25 AM    Originating Location (pt. Location): Home    Distant Location (provider location):  [facility]     Platform used for Video Visit: [employer]egan"

## 2021-01-30 ENCOUNTER — HEALTH MAINTENANCE LETTER (OUTPATIENT)
Age: 51
End: 2021-01-30

## 2021-03-22 ENCOUNTER — HOSPITAL ENCOUNTER (OUTPATIENT)
Dept: MAMMOGRAPHY | Facility: CLINIC | Age: 51
Discharge: HOME | End: 2021-03-22
Attending: FAMILY MEDICINE | Admitting: FAMILY MEDICINE
Payer: COMMERCIAL

## 2021-03-22 DIAGNOSIS — Z12.31 SCREENING MAMMOGRAM FOR HIGH-RISK PATIENT: ICD-10-CM

## 2021-03-22 PROCEDURE — 77067 SCR MAMMO BI INCL CAD: CPT

## 2021-03-24 NOTE — ANESTHESIA CARE TRANSFER NOTE
Patient: Kendra Montenegro    Procedure(s):  ESOPHAGOGASTRODUODENOSCOPY, WITH BIOPSY    Diagnosis: epigastric pain  diagnostic  Diagnosis Additional Information: No value filed.    Anesthesia Type:   MAC     Note:  Airway :Room Air  Patient transferred to:Phase II  Handoff Report: Identifed the Patient, Identified the Reponsible Provider, Reviewed the pertinent medical history, Discussed the surgical course, Reviewed Intra-OP anesthesia mangement and issues during anesthesia, Set expectations for post-procedure period and Allowed opportunity for questions and acknowledgement of understanding      Vitals: (Last set prior to Anesthesia Care Transfer)    CRNA VITALS  5/28/2019 0724 - 5/28/2019 0754      5/28/2019             Pulse:  72    SpO2:  99 %                Electronically Signed By: JUAN Mae CRNA  May 28, 2019  7:54 AM  
Yes

## 2021-05-04 ENCOUNTER — OFFICE VISIT (OUTPATIENT)
Dept: FAMILY MEDICINE | Facility: CLINIC | Age: 51
End: 2021-05-04
Payer: COMMERCIAL

## 2021-05-04 VITALS
HEIGHT: 63 IN | WEIGHT: 143.6 LBS | SYSTOLIC BLOOD PRESSURE: 124 MMHG | DIASTOLIC BLOOD PRESSURE: 82 MMHG | TEMPERATURE: 97.6 F | HEART RATE: 72 BPM | BODY MASS INDEX: 25.45 KG/M2

## 2021-05-04 DIAGNOSIS — Z12.11 SPECIAL SCREENING FOR MALIGNANT NEOPLASMS, COLON: ICD-10-CM

## 2021-05-04 DIAGNOSIS — E04.9 ENLARGED THYROID: ICD-10-CM

## 2021-05-04 DIAGNOSIS — E06.3 HYPOTHYROIDISM DUE TO HASHIMOTO'S THYROIDITIS: ICD-10-CM

## 2021-05-04 DIAGNOSIS — Z11.59 ENCOUNTER FOR HEPATITIS C SCREENING TEST FOR LOW RISK PATIENT: ICD-10-CM

## 2021-05-04 DIAGNOSIS — Z00.00 ROUTINE GENERAL MEDICAL EXAMINATION AT A HEALTH CARE FACILITY: Primary | ICD-10-CM

## 2021-05-04 DIAGNOSIS — Z80.0 FAMILY HISTORY OF COLON CANCER: ICD-10-CM

## 2021-05-04 DIAGNOSIS — L90.0 LICHEN SCLEROSUS: ICD-10-CM

## 2021-05-04 LAB — TSH SERPL DL<=0.005 MIU/L-ACNC: 1.22 MU/L (ref 0.4–4)

## 2021-05-04 PROCEDURE — 36415 COLL VENOUS BLD VENIPUNCTURE: CPT | Performed by: FAMILY MEDICINE

## 2021-05-04 PROCEDURE — 84443 ASSAY THYROID STIM HORMONE: CPT | Performed by: FAMILY MEDICINE

## 2021-05-04 PROCEDURE — 99396 PREV VISIT EST AGE 40-64: CPT | Performed by: FAMILY MEDICINE

## 2021-05-04 PROCEDURE — 86803 HEPATITIS C AB TEST: CPT | Performed by: FAMILY MEDICINE

## 2021-05-04 RX ORDER — CLOBETASOL PROPIONATE 0.5 MG/G
CREAM TOPICAL
Qty: 15 G | Refills: 1 | Status: SHIPPED | OUTPATIENT
Start: 2021-05-04

## 2021-05-04 ASSESSMENT — MIFFLIN-ST. JEOR: SCORE: 1232.56

## 2021-05-04 NOTE — NURSING NOTE
"Initial /82   Pulse 72   Temp 97.6  F (36.4  C) (Tympanic)   Ht 1.588 m (5' 2.5\")   Wt 65.1 kg (143 lb 9.6 oz)   LMP 11/29/2015 (LMP Unknown)   Breastfeeding No   BMI 25.85 kg/m   Estimated body mass index is 25.85 kg/m  as calculated from the following:    Height as of this encounter: 1.588 m (5' 2.5\").    Weight as of this encounter: 65.1 kg (143 lb 9.6 oz). .      "

## 2021-05-04 NOTE — PROGRESS NOTES
SUBJECTIVE:   CC: Kendra Montenegro is an 50 year old woman who presents for preventive health visit.       Patient has been advised of split billing requirements and indicates understanding: Yes  Healthy Habits:    Do you get at least three servings of calcium containing foods daily (dairy, green leafy vegetables, etc.)? yes    Amount of exercise or daily activities, outside of work: 2-3 day(s) per week    Problems taking medications regularly No    Medication side effects: No    Have you had an eye exam in the past two years? yes    Do you see a dentist twice per year? yes    Do you have sleep apnea, excessive snoring or daytime drowsiness?no      No concerns    Brother recently diagnosed with colon cancer.  Just had his port placed.        Today's PHQ-2 Score:   PHQ-2 ( 1999 Pf[employer]) 5/4/2021 12/3/2020   Q1: Little interest or pleasure in doing things 0 0   Q2: Feeling down, depressed or hopeless 0 0   PHQ-2 Score 0 0       Abuse: Current or Past(Physical, Sexual or Emotional)- No  Do you feel safe in your environment? Yes    Have you ever done Advance Care Planning? (For example, a Health Directive, POLST, or a discussion with a medical provider or your loved ones about your wishes): no    Social History     Tobacco Use     Smoking status: Never Smoker     Smokeless tobacco: Never Used   Substance Use Topics     Alcohol use: No     If you drink alcohol do you typically have >3 drinks per day or >7 drinks per week? No                     Reviewed orders with patient.  Reviewed health maintenance and updated orders accordingly - Yes      FSH-7: No flowsheet data found.    Mammogram Screening: Recommended annual mammography  Pertinent mammograms are reviewed under the imaging tab.    Pertinent mammograms are reviewed under the imaging tab.  History of abnormal Pap smear: Status post benign hysterectomy. Health Maintenance and Surgical History updated.  PAP / HPV 12/4/2014   PAP NIL     Reviewed and updated as needed  this visit by clinical staff  Tobacco  Allergies  Meds   Med Hx  Surg Hx  Fam Hx  Soc Hx        Reviewed and updated as needed this visit by Provider  Tobacco  Allergies  Meds   Med Hx  Surg Hx  Fam Hx  Soc Hx       Past Medical History:   Diagnosis Date     Hx of abnormal Pap smear ??    possible CRYO in the past?     Thyroid disease       Past Surgical History:   Procedure Laterality Date     CYSTOSCOPY N/A 1/11/2016    Procedure: CYSTOSCOPY;  Surgeon: Juan Jose Burgess MD;  Location: [facility]     DILATION AND CURETTAGE, HYSTEROSCOPY DIAGNOSTIC, COMBINED N/A 10/2/2015    Procedure: COMBINED DILATION AND CURETTAGE, HYSTEROSCOPY DIAGNOSTIC;  Surgeon: Juan Jose Burgess MD;  Location: [facility]     ESOPHAGOSCOPY, GASTROSCOPY, DUODENOSCOPY (EGD), COMBINED N/A 5/28/2019    Procedure: ESOPHAGOGASTRODUODENOSCOPY, WITH BIOPSY;  Surgeon: Benjamin Moran MD;  Location: [facility]     HYSTERECTOMY, PAP NO LONGER INDICATED  1/11/2016     LAPAROSCOPIC HYSTERECTOMY TOTAL, BILATERAL SALPINGO-OOPHORECTOMY, COMBINED N/A 1/11/2016    Procedure: COMBINED LAPAROSCOPIC HYSTERECTOMY TOTAL, SALPINGO-OOPHORECTOMY;  Surgeon: Juan Jose Burgess MD;  Location: [facility]     SURGICAL HISTORY OF -   1999    removal of endometriosis,L ovary and a fallopian tube     TUBAL LIGATION         ROS:  CONSTITUTIONAL: NEGATIVE for fever, chills, change in weight  INTEGUMENTARY/SKIN: NEGATIVE for worrisome rashes, moles or lesions  EYES: NEGATIVE for vision changes or irritation  ENT: NEGATIVE for ear, mouth and throat problems  RESP: NEGATIVE for significant cough or SOB  BREAST: NEGATIVE for masses, tenderness or discharge  CV: NEGATIVE for chest pain, palpitations or peripheral edema  GI: NEGATIVE for nausea, abdominal pain, heartburn, or change in bowel habits  : NEGATIVE for unusual urinary or vaginal symptoms. No vaginal bleeding.  MUSCULOSKELETAL: NEGATIVE for significant arthralgias or myalgia  NEURO: NEGATIVE for weakness, dizziness or  "paresthesias  PSYCHIATRIC: NEGATIVE for changes in mood or affect     When she is good about using the estradiol (usually 2 times per week) symptoms are resolved.  Uses clobetasol infrequently, has been trying to use it every other day for the last 2 weeks.  No itching or symptoms, notices different appearance when she looks    OBJECTIVE:   /82   Pulse 72   Temp 97.6  F (36.4  C) (Tympanic)   Ht 1.588 m (5' 2.5\")   Wt 65.1 kg (143 lb 9.6 oz)   LMP 11/29/2015 (LMP Unknown)   Breastfeeding No   BMI 25.85 kg/m    EXAM:  GENERAL APPEARANCE: healthy, alert and no distress  EYES: Eyes grossly normal to inspection, PERRL and conjunctivae and sclerae normal  HENT: ear canals and TM's normal, nose and mouth without ulcers or lesions, oropharynx clear and oral mucous membranes moist  NECK: no adenopathy, no asymmetry, masses, or scars and thyroid enlarged  RESP: lungs clear to auscultation - no rales, rhonchi or wheezes  BREAST: declined by pt  CV: regular rate and rhythm, normal S1 S2, no S3 or S4, no murmur, click or rub, no peripheral edema and peripheral pulses strong  ABDOMEN: soft, nontender, no hepatosplenomegaly, no masses and bowel sounds normal   (female):  female external genitalia with lichen sclerosus.  Fusion of clitoral lamb and labia minora/majora noted, skin pale in color.  No fissures or ulcers, normal urethral meatus  MS: no musculoskeletal defects are noted and gait is age appropriate without ataxia  NEURO: Normal strength and tone, sensory exam grossly normal, mentation intact and speech normal  PSYCH: mentation appears normal and affect normal/bright    Diagnostic Test Results:  Labs reviewed in Epic    ASSESSMENT/PLAN:       ICD-10-CM    1. Routine general medical examination at a health care facility  Z00.00    2. Lichen sclerosus  L90.0 clobetasol (TEMOVATE) 0.05 % external cream   3. Hypothyroidism due to Hashimoto's thyroiditis  E03.8 TSH    E06.3    4. Enlarged thyroid  E04.9 US " "Thyroid   5. Encounter for hepatitis C screening test for low risk patient  Z11.59 **Hepatitis C Screen Reflex to RNA FUTURE anytime   6. Special screening for malignant neoplasms, colon  Z12.11 GASTROENTEROLOGY ADULT REF PROCEDURE ONLY   7. Family history of colon cancer  Z80.0 GASTROENTEROLOGY ADULT REF PROCEDURE ONLY     Lichen sclerosus:  Reviewed appropriate dosing of clobetasol (daily for 4-6 weeks then every other day then twice weekly)  Pt will restart and monitor.  Discussed increased risk of vulvar cancer and signs/symptoms that should prompt reeval    Hypothyroid:  clinically euthyroid, labs ordered    Enlarged thyroid:  Pt notes bigger over the last few months.  Enlarged on exam, repeat ultrasound ordered.    Reviewed pt is now consider at elevated risk for colon cancer.  Will need colonoscopy q5 years minimum.  She verbalized understanding    Patient has been advised of split billing requirements and indicates understanding: Yes  COUNSELING:   Reviewed preventive health counseling, as reflected in patient instructions    Estimated body mass index is 25.85 kg/m  as calculated from the following:    Height as of this encounter: 1.588 m (5' 2.5\").    Weight as of this encounter: 65.1 kg (143 lb 9.6 oz).        She reports that she has never smoked. She has never used smokeless tobacco.      Counseling Resources:  ATP IV Guidelines  Pooled Cohorts Equation Calculator  Breast Cancer Risk Calculator  BRCA-Related Cancer Risk Assessment: FHS-7 Tool  FRAX Risk Assessment  ICSI Preventive Guidelines  Dietary Guidelines for Americans, 2010  USDA's MyPlate  ASA Prophylaxis  Lung CA Screening    Rosario Suazo [facility]  "

## 2021-05-04 NOTE — PATIENT INSTRUCTIONS
Please schedule your colonoscopy as soon as you are able.  Due to your family history this should be done every 5 years.    I will let you know the lab results when available.    The thyroid does seem larger today.  We should repeat the ultrasound to take a look.  You can call [phone] to get that scheduled    Preventive Health Recommendations  Female Ages 50 - 64    Yearly exam: See your health care provider every year in order to  o Review health changes.   o Discuss preventive care.    o Review your medicines if your doctor has prescribed any.      Get a Pap test every three years (unless you have an abnormal result and your provider advises testing more often).    If you get Pap tests with HPV test, you only need to test every 5 years, unless you have an abnormal result.     You do not need a Pap test if your uterus was removed (hysterectomy) and you have not had cancer.    You should be tested each year for STDs (sexually transmitted diseases) if you're at risk.     Have a mammogram every 1 to 2 years.    Have a colonoscopy at age 50, or have a yearly FIT test (stool test). These exams screen for colon cancer.      Have a cholesterol test every 5 years, or more often if advised.    Have a diabetes test (fasting glucose) every three years. If you are at risk for diabetes, you should have this test more often.     If you are at risk for osteoporosis (brittle bone disease), think about having a bone density scan (DEXA).    Shots: Get a flu shot each year. Get a tetanus shot every 10 years.    Nutrition:     Eat at least 5 servings of fruits and vegetables each day.    Eat whole-grain bread, whole-wheat pasta and brown rice instead of white grains and rice.    Get adequate Calcium and Vitamin D.     Lifestyle    Exercise at least 150 minutes a week (30 minutes a day, 5 days a week). This will help you control your weight and prevent disease.    Limit alcohol to one drink per day.    No smoking.     Wear  sunscreen to prevent skin cancer.     See your dentist every six months for an exam and cleaning.    See your eye doctor every 1 to 2 years.

## 2021-05-04 NOTE — LETTER
May 7, 2021      Kendra Montenegro  [address] [facility] [facility] [address]        Dear Kendra,    We are writing to inform you of your test results. Your hepatitis C testing was negative as expected.     Resulted Orders   TSH   Result Value Ref Range    TSH 1.22 0.40 - 4.00 mU/L   **Hepatitis C Screen Reflex to RNA FUTURE anytime   Result Value Ref Range    Hepatitis C Antibody Nonreactive NR^Nonreactive      Comment:      Assay performance characteristics have not been established for newborns,   infants, and children         If you have any questions or concerns, please call the clinic at the number listed above.       Sincerely,      Rosario Suazo, DO

## 2021-05-05 LAB — HCV AB SERPL QL IA: NONREACTIVE

## 2021-05-09 DIAGNOSIS — E06.3 HYPOTHYROIDISM DUE TO HASHIMOTO'S THYROIDITIS: ICD-10-CM

## 2021-05-10 RX ORDER — LEVOTHYROXINE SODIUM 137 UG/1
TABLET ORAL
Qty: 90 TABLET | Refills: 3 | Status: SHIPPED | OUTPATIENT
Start: 2021-05-10

## 2021-05-11 ENCOUNTER — HOSPITAL ENCOUNTER (OUTPATIENT)
Dept: ULTRASOUND IMAGING | Facility: CLINIC | Age: 51
Discharge: HOME | End: 2021-05-11
Attending: FAMILY MEDICINE | Admitting: FAMILY MEDICINE
Payer: COMMERCIAL

## 2021-05-11 DIAGNOSIS — E04.9 ENLARGED THYROID: ICD-10-CM

## 2021-05-11 PROCEDURE — 76536 US EXAM OF HEAD AND NECK: CPT

## 2021-05-13 DIAGNOSIS — Z11.59 ENCOUNTER FOR SCREENING FOR OTHER VIRAL DISEASES: ICD-10-CM

## 2021-05-24 DIAGNOSIS — Z11.59 ENCOUNTER FOR SCREENING FOR OTHER VIRAL DISEASES: ICD-10-CM

## 2021-05-24 LAB
SARS-COV-2 RNA RESP QL NAA+PROBE: NORMAL
SPECIMEN SOURCE: NORMAL

## 2021-05-24 PROCEDURE — U0005 INFEC AGEN DETEC AMPLI PROBE: HCPCS | Performed by: SURGERY

## 2021-05-24 PROCEDURE — U0003 INFECTIOUS AGENT DETECTION BY NUCLEIC ACID (DNA OR RNA); SEVERE ACUTE RESPIRATORY SYNDROME CORONAVIRUS 2 (SARS-COV-2) (CORONAVIRUS DISEASE [COVID-19]), AMPLIFIED PROBE TECHNIQUE, MAKING USE OF HIGH THROUGHPUT TECHNOLOGIES AS DESCRIBED BY CMS-2020-01-R: HCPCS | Performed by: SURGERY

## 2021-05-25 ENCOUNTER — ANESTHESIA EVENT (OUTPATIENT)
Dept: GASTROENTEROLOGY | Facility: CLINIC | Age: 51
End: 2021-05-25
Payer: COMMERCIAL

## 2021-05-25 LAB
LABORATORY COMMENT REPORT: NORMAL
SARS-COV-2 RNA RESP QL NAA+PROBE: NEGATIVE
SPECIMEN SOURCE: NORMAL

## 2021-05-25 NOTE — ANESTHESIA PREPROCEDURE EVALUATION
Anesthesia Pre-Procedure Evaluation    Patient: Kendra Montenegro   MRN: [id number] : 1970        Preoperative Diagnosis: Special screening for malignant neoplasm of colon [Z12.11]   Procedure : Procedure(s):  COLONOSCOPY     Past Medical History:   Diagnosis Date     Hx of abnormal Pap smear ??    possible CRYO in the past?     Thyroid disease       Past Surgical History:   Procedure Laterality Date     CYSTOSCOPY N/A 2016    Procedure: CYSTOSCOPY;  Surgeon: Juan Jose Burgess MD;  Location: [facility]     DILATION AND CURETTAGE, HYSTEROSCOPY DIAGNOSTIC, COMBINED N/A 10/2/2015    Procedure: COMBINED DILATION AND CURETTAGE, HYSTEROSCOPY DIAGNOSTIC;  Surgeon: Juan Jose Burgess MD;  Location: [facility]     ESOPHAGOSCOPY, GASTROSCOPY, DUODENOSCOPY (EGD), COMBINED N/A 2019    Procedure: ESOPHAGOGASTRODUODENOSCOPY, WITH BIOPSY;  Surgeon: Benjamin Moran MD;  Location: [facility]     HYSTERECTOMY, PAP NO LONGER INDICATED  2016     LAPAROSCOPIC HYSTERECTOMY TOTAL, BILATERAL SALPINGO-OOPHORECTOMY, COMBINED N/A 2016    Procedure: COMBINED LAPAROSCOPIC HYSTERECTOMY TOTAL, SALPINGO-OOPHORECTOMY;  Surgeon: Juan Jose Burgess MD;  Location: [facility]     SURGICAL HISTORY OF -       removal of endometriosis,L ovary and a fallopian tube     TUBAL LIGATION        Allergies   Allergen Reactions     Nka [No Known Allergies]       Social History     Tobacco Use     Smoking status: Never Smoker     Smokeless tobacco: Never Used   Substance Use Topics     Alcohol use: No      Wt Readings from Last 1 Encounters:   21 65.1 kg (143 lb 9.6 oz)        Anesthesia Evaluation   Pt has had prior anesthetic. Type: MAC and General.        ROS/MED HX  ENT/Pulmonary:  - neg pulmonary ROS     Neurologic:  - neg neurologic ROS     Cardiovascular:     (+) Dyslipidemia -----    METS/Exercise Tolerance:     Hematologic:  - neg hematologic  ROS     Musculoskeletal:  - neg musculoskeletal ROS     GI/Hepatic:  - neg GI/hepatic ROS      Renal/Genitourinary:  - neg Renal ROS     Endo:     (+) thyroid problem, hypothyroidism Thyroid disease - Other Thyroid nodule; Thyroidmegaly,     Psychiatric/Substance Use:  - neg psychiatric ROS     Infectious Disease:  - neg infectious disease ROS     Malignancy:  - neg malignancy ROS     Other:  - neg other ROS          Physical Exam    Airway        Mallampati: I   TM distance: > 3 FB   Neck ROM: full   Mouth opening: > 3 cm    Respiratory Devices and Support         Dental  no notable dental history         Cardiovascular   cardiovascular exam normal          Pulmonary   pulmonary exam normal                OUTSIDE LABS:  CBC:   Lab Results   Component Value Date    WBC 4.9 05/16/2019    WBC 4.1 04/21/2017    HGB 13.9 05/16/2019    HGB 13.0 04/21/2017    HCT 42.1 05/16/2019    HCT 39.6 04/21/2017     05/16/2019     04/21/2017     BMP:   Lab Results   Component Value Date     05/16/2019     12/04/2015    POTASSIUM 3.8 05/16/2019    POTASSIUM 4.0 12/04/2015    CHLORIDE 103 05/16/2019    CHLORIDE 107 12/04/2015    CO2 30 05/16/2019    CO2 25 12/04/2015    BUN 17 05/16/2019    BUN 13 12/04/2015    CR 0.89 05/16/2019    CR 0.76 12/04/2015    GLC 92 05/16/2019     (H) 12/04/2015     COAGS:   Lab Results   Component Value Date    INR 1.05 12/04/2015     POC:   Lab Results   Component Value Date    HCG Negative 01/11/2016     HEPATIC:   Lab Results   Component Value Date    ALBUMIN 4.8 05/16/2019    PROTTOTAL 8.3 05/16/2019    ALT 17 05/16/2019    AST 16 05/16/2019    ALKPHOS 52 05/16/2019    BILITOTAL 0.6 05/16/2019     OTHER:   Lab Results   Component Value Date    COLBY 9.3 05/16/2019    LIPASE 148 05/16/2019    TSH 1.22 05/04/2021    T4 1.11 08/26/2015       Anesthesia Plan    ASA Status:  2   NPO Status:  NPO Appropriate    Anesthesia Type: General.   Induction: Propofol.   Maintenance: TIVA.        Consents    Anesthesia Plan(s) and associated risks, benefits, and realistic  alternatives discussed. Questions answered and patient/representative(s) expressed understanding.     - Discussed with:  Patient         Postoperative Care    Pain management: IV analgesics, Oral pain medications, Multi-modal analgesia.   PONV prophylaxis: Ondansetron (or other 5HT-3), Dexamethasone or Solumedrol     Comments:                JUAN Hernandez CRNA

## 2021-05-27 ENCOUNTER — APPOINTMENT (OUTPATIENT)
Dept: GENERAL RADIOLOGY | Facility: CLINIC | Age: 51
End: 2021-05-27
Attending: SURGERY
Payer: COMMERCIAL

## 2021-05-27 ENCOUNTER — HOSPITAL ENCOUNTER (OUTPATIENT)
Facility: CLINIC | Age: 51
Discharge: HOME | End: 2021-05-27
Attending: SURGERY | Admitting: SURGERY
Payer: COMMERCIAL

## 2021-05-27 ENCOUNTER — ANESTHESIA (OUTPATIENT)
Dept: GASTROENTEROLOGY | Facility: CLINIC | Age: 51
End: 2021-05-27
Payer: COMMERCIAL

## 2021-05-27 VITALS
WEIGHT: 143 LBS | HEART RATE: 64 BPM | DIASTOLIC BLOOD PRESSURE: 92 MMHG | OXYGEN SATURATION: 99 % | BODY MASS INDEX: 25.34 KG/M2 | TEMPERATURE: 98.1 F | RESPIRATION RATE: 14 BRPM | SYSTOLIC BLOOD PRESSURE: 122 MMHG | HEIGHT: 63 IN

## 2021-05-27 LAB — COLONOSCOPY: NORMAL

## 2021-05-27 PROCEDURE — 250N000011 HC RX IP 250 OP 636: Performed by: NURSE ANESTHETIST, CERTIFIED REGISTERED

## 2021-05-27 PROCEDURE — G0105 COLORECTAL SCRN; HI RISK IND: HCPCS | Performed by: SURGERY

## 2021-05-27 PROCEDURE — 999N000065 XR CHEST PORT 1 VIEW

## 2021-05-27 PROCEDURE — 45378 DIAGNOSTIC COLONOSCOPY: CPT | Performed by: SURGERY

## 2021-05-27 PROCEDURE — 250N000009 HC RX 250: Performed by: NURSE ANESTHETIST, CERTIFIED REGISTERED

## 2021-05-27 PROCEDURE — 370N000017 HC ANESTHESIA TECHNICAL FEE, PER MIN: Performed by: SURGERY

## 2021-05-27 PROCEDURE — 250N000009 HC RX 250: Performed by: SURGERY

## 2021-05-27 PROCEDURE — 258N000003 HC RX IP 258 OP 636: Performed by: SURGERY

## 2021-05-27 RX ORDER — PROPOFOL 10 MG/ML
INJECTION, EMULSION INTRAVENOUS PRN
Status: DISCONTINUED | OUTPATIENT
Start: 2021-05-27 | End: 2021-05-27

## 2021-05-27 RX ORDER — LIDOCAINE HYDROCHLORIDE 10 MG/ML
INJECTION, SOLUTION EPIDURAL; INFILTRATION; INTRACAUDAL; PERINEURAL PRN
Status: DISCONTINUED | OUTPATIENT
Start: 2021-05-27 | End: 2021-05-27

## 2021-05-27 RX ORDER — ONDANSETRON 2 MG/ML
4 INJECTION INTRAMUSCULAR; INTRAVENOUS
Status: DISCONTINUED | OUTPATIENT
Start: 2021-05-27 | End: 2021-05-27 | Stop reason: HOSPADM

## 2021-05-27 RX ORDER — GLYCOPYRROLATE 0.2 MG/ML
INJECTION, SOLUTION INTRAMUSCULAR; INTRAVENOUS PRN
Status: DISCONTINUED | OUTPATIENT
Start: 2021-05-27 | End: 2021-05-27

## 2021-05-27 RX ORDER — PROPOFOL 10 MG/ML
INJECTION, EMULSION INTRAVENOUS CONTINUOUS PRN
Status: DISCONTINUED | OUTPATIENT
Start: 2021-05-27 | End: 2021-05-27

## 2021-05-27 RX ORDER — SODIUM CHLORIDE, SODIUM LACTATE, POTASSIUM CHLORIDE, CALCIUM CHLORIDE 600; 310; 30; 20 MG/100ML; MG/100ML; MG/100ML; MG/100ML
INJECTION, SOLUTION INTRAVENOUS CONTINUOUS
Status: DISCONTINUED | OUTPATIENT
Start: 2021-05-27 | End: 2021-05-27 | Stop reason: HOSPADM

## 2021-05-27 RX ORDER — LIDOCAINE 40 MG/G
CREAM TOPICAL
Status: DISCONTINUED | OUTPATIENT
Start: 2021-05-27 | End: 2021-05-27 | Stop reason: HOSPADM

## 2021-05-27 RX ADMIN — PROPOFOL 100 MG: 10 INJECTION, EMULSION INTRAVENOUS at 08:53

## 2021-05-27 RX ADMIN — SODIUM CHLORIDE, POTASSIUM CHLORIDE, SODIUM LACTATE AND CALCIUM CHLORIDE: 600; 310; 30; 20 INJECTION, SOLUTION INTRAVENOUS at 08:34

## 2021-05-27 RX ADMIN — LIDOCAINE HYDROCHLORIDE 0.1 ML: 10 INJECTION, SOLUTION EPIDURAL; INFILTRATION; INTRACAUDAL; PERINEURAL at 08:34

## 2021-05-27 RX ADMIN — PROPOFOL 200 MCG/KG/MIN: 10 INJECTION, EMULSION INTRAVENOUS at 08:53

## 2021-05-27 RX ADMIN — GLYCOPYRROLATE 0.1 MG: 0.2 INJECTION, SOLUTION INTRAMUSCULAR; INTRAVENOUS at 08:53

## 2021-05-27 RX ADMIN — LIDOCAINE HYDROCHLORIDE 50 MG: 10 INJECTION, SOLUTION EPIDURAL; INFILTRATION; INTRACAUDAL; PERINEURAL at 08:53

## 2021-05-27 ASSESSMENT — MIFFLIN-ST. JEOR: SCORE: 1229.83

## 2021-05-27 NOTE — ANESTHESIA CARE TRANSFER NOTE
Patient: Kendra Montenegro    Procedure(s):  COLONOSCOPY    Diagnosis: Special screening for malignant neoplasm of colon [Z12.11]  Diagnosis Additional Information: No value filed.    Anesthesia Type:   General     Note:    Oropharynx: oropharynx clear of all foreign objects  Level of Consciousness: drowsy  Oxygen Supplementation: nasal cannula    Independent Airway: airway patency satisfactory and stable  Dentition: dentition unchanged  Vital Signs Stable: post-procedure vital signs reviewed and stable  Report to RN Given: handoff report given  Patient transferred to: Phase II    Handoff Report: Identifed the Patient, Identified the Reponsible Provider, Reviewed the pertinent medical history, Discussed the surgical course, Reviewed Intra-OP anesthesia mangement and issues during anesthesia, Set expectations for post-procedure period and Allowed opportunity for questions and acknowledgement of understanding      Vitals: (Last set prior to Anesthesia Care Transfer)  CRNA VITALS  5/27/2021 0840 - 5/27/2021 0912      5/27/2021             Pulse:  59    Ht Rate:  59    SpO2:  99 %    Resp Rate (observed):  18        Electronically Signed By: JUAN Cowan CRNA  May 27, 2021  9:12 AM

## 2021-05-27 NOTE — H&P
"50 year old year old female here for colonoscopy for screening.    Patient Active Problem List   Diagnosis     Thyromegaly     Hypothyroidism     CARDIOVASCULAR SCREENING; LDL GOAL LESS THAN 160     Thyroid nodule     Lichen sclerosus     Dyspareunia in female     Family history of colon cancer       Past Medical History:   Diagnosis Date     Hx of abnormal Pap smear ??    possible CRYO in the past?     Thyroid disease        Past Surgical History:   Procedure Laterality Date     CYSTOSCOPY N/A 1/11/2016    Procedure: CYSTOSCOPY;  Surgeon: Juan Jose Burgess MD;  Location: [facility]     DILATION AND CURETTAGE, HYSTEROSCOPY DIAGNOSTIC, COMBINED N/A 10/2/2015    Procedure: COMBINED DILATION AND CURETTAGE, HYSTEROSCOPY DIAGNOSTIC;  Surgeon: Juan Joes Burgess MD;  Location: [facility]     ESOPHAGOSCOPY, GASTROSCOPY, DUODENOSCOPY (EGD), COMBINED N/A 5/28/2019    Procedure: ESOPHAGOGASTRODUODENOSCOPY, WITH BIOPSY;  Surgeon: Benjamin Moran MD;  Location: [facility]     HYSTERECTOMY, PAP NO LONGER INDICATED  1/11/2016     LAPAROSCOPIC HYSTERECTOMY TOTAL, BILATERAL SALPINGO-OOPHORECTOMY, COMBINED N/A 1/11/2016    Procedure: COMBINED LAPAROSCOPIC HYSTERECTOMY TOTAL, SALPINGO-OOPHORECTOMY;  Surgeon: Juan Jose Burgess MD;  Location: [facility]     SURGICAL HISTORY OF -   1999    removal of endometriosis,L ovary and a fallopian tube     TUBAL LIGATION         @[facility]@    No current outpatient medications on file.       Allergies   Allergen Reactions     Nka [No Known Allergies]        Pt reports that she has never smoked. She has never used smokeless tobacco. She reports that she does not drink alcohol or use drugs.    Exam:  /85 (BP Location: Right arm)   Pulse 73   Temp 98.1  F (36.7  C) (Oral)   Ht 1.588 m (5' 2.5\")   Wt 64.9 kg (143 lb)   LMP 11/29/2015 (LMP Unknown)   SpO2 99%   BMI 25.74 kg/m      Awake, Alert OX3  Lungs - CTA bilaterally  CV - RRR, no murmurs, distal pulses intact  Abd - soft, non-distended, " non-tender, +BS  Extr - No cyanosis or edema    A/P 50 year old year old female in need of colonoscopy for screening. Risks, benefits, alternatives, and complications were discussed including the possibility of perforation and the patient agreed to proceed    Curt Busby MD

## 2021-05-27 NOTE — ANESTHESIA POSTPROCEDURE EVALUATION
Patient: Kendra Montenegro    Procedure(s):  COLONOSCOPY    Diagnosis:Special screening for malignant neoplasm of colon [Z12.11]  Diagnosis Additional Information: No value filed.    Anesthesia Type:  General    Note:  Disposition: Outpatient   Postop Pain Control: Uneventful            Sign Out: Well controlled pain   PONV: No   Neuro/Psych: Uneventful            Sign Out: Acceptable/Baseline neuro status   Airway/Respiratory: Uneventful            Sign Out: Acceptable/Baseline resp. status   CV/Hemodynamics: Uneventful            Sign Out: Acceptable CV status; No obvious hypovolemia; No obvious fluid overload   Other NRE: NONE   DID A NON-ROUTINE EVENT OCCUR? No           Last vitals:  Vitals:    05/27/21 0801   BP: 131/85   Pulse: 73   Temp: 36.7  C (98.1  F)   SpO2: 99%       Last vitals prior to Anesthesia Care Transfer:  CRNA VITALS  5/27/2021 0840 - 5/27/2021 0923      5/27/2021             Pulse:  59    Ht Rate:  59    SpO2:  99 %    Resp Rate (observed):  18          Electronically Signed By: JUAN Cowan CRNA  May 27, 2021  9:23 AM

## 2021-05-30 ENCOUNTER — RECORDS - HEALTHEAST (OUTPATIENT)
Dept: ADMINISTRATIVE | Facility: CLINIC | Age: 51
End: 2021-05-30

## 2021-09-11 ENCOUNTER — HEALTH MAINTENANCE LETTER (OUTPATIENT)
Age: 51
End: 2021-09-11

## 2021-11-30 ENCOUNTER — VIRTUAL VISIT (OUTPATIENT)
Dept: FAMILY MEDICINE | Facility: CLINIC | Age: 51
End: 2021-11-30
Payer: COMMERCIAL

## 2021-11-30 DIAGNOSIS — R05.9 COUGH: Primary | ICD-10-CM

## 2021-11-30 PROCEDURE — 99213 OFFICE O/P EST LOW 20 MIN: CPT | Mod: TEL | Performed by: PHYSICIAN ASSISTANT

## 2021-11-30 RX ORDER — ALBUTEROL SULFATE 90 UG/1
2 AEROSOL, METERED RESPIRATORY (INHALATION) EVERY 6 HOURS PRN
Qty: 18 G | Refills: 0 | Status: SHIPPED | OUTPATIENT
Start: 2021-11-30

## 2021-11-30 RX ORDER — BENZONATATE 200 MG/1
200 CAPSULE ORAL 3 TIMES DAILY PRN
Qty: 30 CAPSULE | Refills: 0 | Status: SHIPPED | OUTPATIENT
Start: 2021-11-30

## 2021-11-30 NOTE — PATIENT INSTRUCTIONS
Instructions for Patients  It is recommended that you have a test for coronavirus (COVID-19). This illness can cause fever, cough and trouble breathing. Many people get a mild case and get better on their own. Some people can get very sick.     Please follow these steps:    1. We will call to schedule your test.  2. A member of our care team will ask you some questions. Then, they will use a swab to collect samples from your nose and throat.     Our testing team will send you your test results.    How can I protect others?    Stay home and away from others (self-isolate) until:    You ve had no fever--and no medicine that reduces fever--for 1 full day (24 hours). And      Your other symptoms have resolved (gotten better). For example, your cough or breathing has improved. And     At least 10 days have passed since your symptoms started.    Stay at least 6 feet away from others. (If someone will drive you to your test, stay in the backseat, as far away from the  as you can.)     Don t go to work, school or anywhere else. When it s time for your test, go straight to the testing site. Don t make any stops on the way there or back.     Wash your hands and face often. Use soap and water.     Cover your mouth and nose with a mask, tissue or washcloth.     Don t touch anyone. No hugging, kissing or handshakes.    How can I take care of myself?    1. Get lots of rest. Drink extra fluids (unless a doctor has told you not to).     2. Take Tylenol (acetaminophen) for fever or pain. If you have liver or kidney problems, ask your family doctor if it's okay to take Tylenol.     Adults can take either:     650 mg (two 325 mg pills) every 4 to 6 hours, or     1,000 mg (two 500 mg pills) every 8 hours as needed.     Note: Don't take more than 3,000 mg in one day.   Acetaminophen is found in many medicines (both prescribed and over-the-counter medicines). Read all labels to be sure you don't take too much.   For children,  check the Tylenol bottle for the right dose. The dose is based on  the child's age or weight.    3. If you have other health problems (like cancer, heart failure, an organ transplant or severe kidney disease): Call your specialty clinic if you don't feel better in the next 2 days.    4. Know when to call 911: If your breathing is so bad that it keeps you from doing normal activities, call 911 or go to the emergency room. Tell them that you've been staying home and may have COVID-19.      Thank you for limiting contact with others, wearing a simple mask to cover your cough, practice good hand hygiene habits and accessing our virtual services where possible to limit the spread of this virus.    For more information about COVID19 and options for caring for yourself at home, please visit the CDC website at https://www.cdc.gov/coronavirus/2019-ncov/about/steps-when-sick.html  For more options for care at [facility], please visit our website at [email or web address]

## 2021-11-30 NOTE — PROGRESS NOTES
"Kendra is a 51 year old who is being evaluated via a billable telephone visit.      What phone number would you like to be contacted at? [phone]  How would you like to obtain your AVS? MyChart    Assessment & Plan   Cough  Pt with 2 days of URI symptoms consistent with Covid-19. No red flag signs or symptoms today. Able to talk in full sentences. Covid-19 PCR testing ordered. Discussed prognosis, self-isolation and supportive treatment of their symptoms. Answered all questions. Call us prn for any new, changing or worsening symptoms.   - Symptomatic COVID-19 Virus (Coronavirus) by PCR; Future  - albuterol (PROAIR HFA/PROVENTIL HFA/VENTOLIN HFA) 108 (90 Base) MCG/ACT inhaler; Inhale 2 puffs into the lungs every 6 hours as needed for shortness of breath / dyspnea (cough)  - benzonatate (TESSALON) 200 MG capsule; Take 1 capsule (200 mg) by mouth 3 times daily as needed for cough     BMI:   Estimated body mass index is 25.74 kg/m  as calculated from the following:    Height as of 5/27/21: 1.588 m (5' 2.5\").    Weight as of 5/27/21: 64.9 kg (143 lb).       Return in about 1 week (around 12/7/2021), or if symptoms worsen or fail to improve, for Video Visit.    SERAFIN Oquendo [facility]    Subjective   Kendra is a 51 year old who presents for the following health issues     HPI   Concern for COVID-19  About how many days ago did these symptoms start? 2 days   Is this your first visit for this illness? Yes  In the 14 days before your symptoms started, have you had close contact with someone with COVID-19 (Coronavirus)? No  Do you have a fever or chills? No  Are you having new or worsening difficulty breathing? No  Do you have new or worsening cough? Yes, I am coughing up mucus.  Have you had any new or unexplained body aches? YES    Have you experienced any of the following NEW symptoms?    Headache: YES    Sore throat: No    Loss of taste or smell: No    Chest pain: No    Diarrhea: " No    Rash: No  What treatments have you tried? Vitamins   Who do you live with?  and kids   Are you, or a household member, a healthcare worker or a ? No  Do you live in a nursing home, group home, or shelter? No  Do you have a way to get food/medications if quarantined? Yes, I have a friend or family member who can help me.    Review of Systems   See HPI       Objective       Vitals:  No vitals were obtained today due to virtual visit.    Physical Exam   healthy, alert and no distress  PSYCH: Alert and oriented times 3; coherent speech, normal   rate and volume, able to articulate logical thoughts, able   to abstract reason, no tangential thoughts, no hallucinations   or delusions  Her affect is normal  RESP: No cough, no audible wheezing, able to talk in full sentences  Remainder of exam unable to be completed due to telephone visits      Phone call duration: 8 minutes

## 2022-03-05 DIAGNOSIS — N94.10 DYSPAREUNIA IN FEMALE: ICD-10-CM

## 2022-03-07 RX ORDER — ESTRADIOL 0.5 MG/1
TABLET ORAL
Qty: 18 TABLET | Refills: 0 | Status: SHIPPED | OUTPATIENT
Start: 2022-03-07 | End: 2022-04-22

## 2022-03-07 NOTE — TELEPHONE ENCOUNTER
Routing refill request to provider for review/approval because:  Last recorded blood pressure does not meet RN protocol parameters    Glynn Gallardo RN

## 2022-06-12 ENCOUNTER — HEALTH MAINTENANCE LETTER (OUTPATIENT)
Age: 52
End: 2022-06-12

## 2022-10-29 ENCOUNTER — HEALTH MAINTENANCE LETTER (OUTPATIENT)
Age: 52
End: 2022-10-29

## 2023-01-25 ENCOUNTER — HOSPITAL ENCOUNTER (OUTPATIENT)
Dept: MAMMOGRAPHY | Facility: CLINIC | Age: 53
Discharge: HOME | End: 2023-01-25
Attending: PHYSICIAN ASSISTANT | Admitting: PHYSICIAN ASSISTANT
Payer: COMMERCIAL

## 2023-01-25 DIAGNOSIS — M79.622 PAIN IN LEFT AXILLA: ICD-10-CM

## 2023-01-25 DIAGNOSIS — M79.621 PAIN IN RIGHT AXILLA: ICD-10-CM

## 2023-01-25 PROCEDURE — 76642 ULTRASOUND BREAST LIMITED: CPT | Mod: 50

## 2023-06-24 ENCOUNTER — HEALTH MAINTENANCE LETTER (OUTPATIENT)
Age: 53
End: 2023-06-24

## 2024-02-13 ENCOUNTER — HOSPITAL ENCOUNTER (OUTPATIENT)
Dept: MAMMOGRAPHY | Facility: CLINIC | Age: 54
Discharge: HOME | End: 2024-02-13
Attending: PHYSICIAN ASSISTANT | Admitting: PHYSICIAN ASSISTANT
Payer: COMMERCIAL

## 2024-02-13 DIAGNOSIS — Z12.31 VISIT FOR SCREENING MAMMOGRAM: ICD-10-CM

## 2024-02-13 PROCEDURE — 77067 SCR MAMMO BI INCL CAD: CPT

## 2024-08-17 ENCOUNTER — HEALTH MAINTENANCE LETTER (OUTPATIENT)
Age: 54
End: 2024-08-17

## (undated) RX ORDER — LIDOCAINE HYDROCHLORIDE 10 MG/ML
INJECTION, SOLUTION EPIDURAL; INFILTRATION; INTRACAUDAL; PERINEURAL
Status: DISPENSED
Start: 2019-05-28

## (undated) RX ORDER — GLYCOPYRROLATE 0.2 MG/ML
INJECTION, SOLUTION INTRAMUSCULAR; INTRAVENOUS
Status: DISPENSED
Start: 2019-05-28